# Patient Record
Sex: FEMALE | Race: WHITE | NOT HISPANIC OR LATINO | Employment: FULL TIME | URBAN - METROPOLITAN AREA
[De-identification: names, ages, dates, MRNs, and addresses within clinical notes are randomized per-mention and may not be internally consistent; named-entity substitution may affect disease eponyms.]

---
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
## 2019-05-14 ENCOUNTER — OFFICE VISIT (OUTPATIENT)
Dept: OBSTETRICS AND GYNECOLOGY | Age: 19
End: 2019-05-14

## 2019-05-14 VITALS
HEIGHT: 64 IN | WEIGHT: 225 LBS | DIASTOLIC BLOOD PRESSURE: 70 MMHG | SYSTOLIC BLOOD PRESSURE: 112 MMHG | BODY MASS INDEX: 38.41 KG/M2

## 2019-05-14 DIAGNOSIS — E66.09 CLASS 2 OBESITY DUE TO EXCESS CALORIES WITHOUT SERIOUS COMORBIDITY WITH BODY MASS INDEX (BMI) OF 38.0 TO 38.9 IN ADULT: ICD-10-CM

## 2019-05-14 DIAGNOSIS — Z01.411 ENCOUNTER FOR GYNECOLOGICAL EXAMINATION WITH ABNORMAL FINDING: Primary | ICD-10-CM

## 2019-05-14 DIAGNOSIS — L68.0 HIRSUTISM: ICD-10-CM

## 2019-05-14 DIAGNOSIS — N93.9 ABNORMAL UTERINE BLEEDING (AUB): ICD-10-CM

## 2019-05-14 DIAGNOSIS — L70.8 OTHER ACNE: ICD-10-CM

## 2019-05-14 DIAGNOSIS — N94.6 DYSMENORRHEA: ICD-10-CM

## 2019-05-14 PROBLEM — E66.812 CLASS 2 OBESITY DUE TO EXCESS CALORIES WITHOUT SERIOUS COMORBIDITY WITH BODY MASS INDEX (BMI) OF 38.0 TO 38.9 IN ADULT: Status: ACTIVE | Noted: 2019-05-14

## 2019-05-14 PROCEDURE — 99395 PREV VISIT EST AGE 18-39: CPT | Performed by: OBSTETRICS & GYNECOLOGY

## 2019-05-14 RX ORDER — NORGESTIMATE AND ETHINYL ESTRADIOL 0.25-0.035
1 KIT ORAL DAILY
Qty: 84 TABLET | Refills: 3 | Status: SHIPPED | OUTPATIENT
Start: 2019-05-14 | End: 2020-05-29

## 2019-05-14 NOTE — PROGRESS NOTES
"Subjective   Sita Amaya is a 18 y.o. female cc: annual visit, discuss  bcp , pt have no c/o , menses are regular lastup to 2 days not heavy - looking for a job - likes [employer] - has helped with cramps, acne and heavy bleeding. Has never been SA.      History of Present Illness    The following portions of the patient's history were reviewed and updated as appropriate: allergies, current medications, past family history, past medical history, past social history, past surgical history and problem list.    Review of Systems   Constitutional: Negative for chills, fatigue and fever.   Gastrointestinal: Negative for abdominal distention and abdominal pain.   Genitourinary: Negative for dyspareunia, dysuria, menstrual problem, pelvic pain, vaginal bleeding, vaginal discharge and vaginal pain.   All other systems reviewed and are negative.    /70   Ht 162.6 cm (64\")   Wt 102 kg (225 lb)   LMP 04/20/2019 (Exact Date)   Breastfeeding? No   BMI 38.62 kg/m²     Objective   Physical Exam   Constitutional: She is oriented to person, place, and time. She appears well-developed and well-nourished.   HENT:   Head: Normocephalic and atraumatic.   Eyes: Conjunctivae are normal.   Neck: Normal range of motion. Neck supple. No thyromegaly present.   Cardiovascular: Normal rate and regular rhythm.   Pulmonary/Chest: Effort normal and breath sounds normal.   Abdominal: Soft. Bowel sounds are normal. She exhibits no distension. There is no tenderness.   Genitourinary:   Genitourinary Comments: DEFERRED   Musculoskeletal: Normal range of motion.   Neurological: She is alert and oriented to person, place, and time.   Psychiatric: She has a normal mood and affect. Her behavior is normal.   Nursing note and vitals reviewed.        Assessment/Plan   Sita was seen today for gynecologic exam.    Diagnoses and all orders for this visit:    Encounter for gynecological examination with abnormal finding  -     norgestimate-ethinyl " estradiol (SPRINTEC 28) 0.25-35 MG-MCG per tablet; Take 1 tablet by mouth Daily.    Dysmenorrhea    Other acne  -     norgestimate-ethinyl estradiol (SPRINTEC 28) 0.25-35 MG-MCG per tablet; Take 1 tablet by mouth Daily.    Hirsutism  -     norgestimate-ethinyl estradiol (SPRINTEC 28) 0.25-35 MG-MCG per tablet; Take 1 tablet by mouth Daily.    Abnormal uterine bleeding (AUB)  -     norgestimate-ethinyl estradiol (SPRINTEC 28) 0.25-35 MG-MCG per tablet; Take 1 tablet by mouth Daily.    Class 2 obesity due to excess calories without serious comorbidity with body mass index (BMI) of 38.0 to 38.9 in adult    Counseling was given to patient for the following topics: instructions for management and nutrition, diet and weight loss  .   Return 1 year

## 2019-09-16 ENCOUNTER — OFFICE VISIT (OUTPATIENT)
Dept: FAMILY MEDICINE CLINIC | Facility: CLINIC | Age: 19
End: 2019-09-16

## 2019-09-16 VITALS
DIASTOLIC BLOOD PRESSURE: 72 MMHG | OXYGEN SATURATION: 98 % | RESPIRATION RATE: 17 BRPM | SYSTOLIC BLOOD PRESSURE: 104 MMHG | WEIGHT: 229.6 LBS | TEMPERATURE: 98.1 F | HEART RATE: 75 BPM | BODY MASS INDEX: 39.2 KG/M2 | HEIGHT: 64 IN

## 2019-09-16 DIAGNOSIS — F41.8 DEPRESSION WITH ANXIETY: Primary | ICD-10-CM

## 2019-09-16 DIAGNOSIS — L68.0 HIRSUTISM: ICD-10-CM

## 2019-09-16 DIAGNOSIS — Z00.00 ROUTINE ADULT HEALTH MAINTENANCE: ICD-10-CM

## 2019-09-16 DIAGNOSIS — F90.9 ATTENTION DEFICIT HYPERACTIVITY DISORDER (ADHD), UNSPECIFIED ADHD TYPE: ICD-10-CM

## 2019-09-16 PROCEDURE — 83036 HEMOGLOBIN GLYCOSYLATED A1C: CPT | Performed by: PHYSICIAN ASSISTANT

## 2019-09-16 PROCEDURE — 99204 OFFICE O/P NEW MOD 45 MIN: CPT | Performed by: PHYSICIAN ASSISTANT

## 2019-09-16 NOTE — PROGRESS NOTES
"Subjective   Sita Amaya is a 19 y.o. female present today to establish care with you. She would like to discuss depression with you.     History of Present Illness     Previous PCP- Dr Johnson- last seen 2018. They were treating her for medication for depression/ anxiety. Felt the wellbutrin was causing more panic attacks and was not helping.     Was in school [employer]- 2018- went to school there. They have providers there and they started on medication for moods. States she is not sure what the medication was. It helped sometimes but was not helping all the time. Was filled there as well. Reports it was a medication that she took when having panic attack. It was not daily medication. Rx made her sleepy. Was on Prozac in the past as well. Was on Prozac and Strattera in 6th grade- wouldn't eat and lost weight (was down to 90s lbs). Stopped both medications- states her mother didn't think it was helping. They stopped that and started Wellbutrin  mg. States the medication was working but recently lost her grandfather and great aunt. States she is still crying about it regularly. Cannot fall asleep at night. Started the before her grandfather . When she was in school, she did counseling. States she is no longer doing counseling. Talking to her aunt and her \"best friend\" and has been talking to a friend from college (her college friend told her to grow up and get over it). Was on Adderall for ADHD in the past. Reports when she was little, she took a medication that made her shaky (was for ADHD). Cannot remember any other medications.    No neuropsych testing that she is aware of. When she was in 2nd grade, she had a test that they put \"strips on my head\" but reports it was not an EEG. Was going to [facility]. Dr Johnson was pediatrician.    Last labs- 2018- had done at school. Not sure what they ran.    Is now back home. , helping her aunt clean and helping her cousins with home " school. Has a cousin with a 7 year old child- watched her all summer. Helps grandmother take care of 2 year old. Would like to work in a . States her mother is supposed to call [employer]  to see if she can work there. Swimming and bowling in [employer]. Needs CPE for this.     Had crackers this morning- nonfasting.    The following portions of the patient's history were reviewed and updated as appropriate: allergies, current medications, past family history, past medical history, past social history, past surgical history and problem list.    Review of Systems   All other systems reviewed and are negative.      Objective   Physical Exam   Constitutional: She is oriented to person, place, and time. She appears well-developed and well-nourished.   HENT:   Head: Normocephalic and atraumatic.   Right Ear: External ear normal.   Left Ear: External ear normal.   Nose: Nose normal.   Eyes: Conjunctivae and lids are normal.   Neck: Neck supple. Carotid bruit is not present.   Cardiovascular: Normal rate, regular rhythm, normal heart sounds and intact distal pulses. Exam reveals no gallop and no friction rub.   No murmur heard.  Pulmonary/Chest: Effort normal and breath sounds normal. No respiratory distress. She has no wheezes. She has no rhonchi. She has no rales.   Musculoskeletal: She exhibits no edema or deformity.   Neurological: She is alert and oriented to person, place, and time. Gait normal.   Skin: Skin is warm and dry.   Psychiatric: She has a normal mood and affect. Her speech is normal and behavior is normal. Judgment and thought content normal. Cognition and memory are normal.   Nursing note and vitals reviewed.      Assessment/Plan   Sita was seen today for establish care.    Diagnoses and all orders for this visit:    Depression with anxiety  -     NeuroPsych Testing; Future  -     CBC & Differential  -     Comprehensive Metabolic Panel  -     Lipid Panel With LDL / HDL Ratio  -      Iron and TIBC  -     Ferritin  -     Vitamin B12 & Folate  -     Vitamin D 25 Hydroxy  -     TSH  -     T4, free  -     T3, Free  -     POC Urinalysis Dipstick, Automated  -     POC Glycated Hemoglobin, Total    Attention deficit hyperactivity disorder (ADHD), unspecified ADHD type  -     NeuroPsych Testing; Future  -     CBC & Differential  -     Comprehensive Metabolic Panel  -     Lipid Panel With LDL / HDL Ratio  -     Iron and TIBC  -     Ferritin  -     Vitamin B12 & Folate  -     Vitamin D 25 Hydroxy  -     TSH  -     T4, free  -     T3, Free  -     POC Urinalysis Dipstick, Automated  -     POC Glycated Hemoglobin, Total    Hirsutism  -     Lipid Panel With LDL / HDL Ratio  -     POC Glycated Hemoglobin, Total    Routine adult health maintenance  -     CBC & Differential  -     Comprehensive Metabolic Panel  -     Lipid Panel With LDL / HDL Ratio  -     Iron and TIBC  -     Ferritin  -     Vitamin B12 & Folate  -     Vitamin D 25 Hydroxy  -     TSH  -     T4, free  -     T3, Free  -     POC Urinalysis Dipstick, Automated  -     POC Glycated Hemoglobin, Total      Patient Instructions   19 year old female who presents today as a new patient. Her previous PCP was Dr Johnson. She was last seen 11/2018. They were treating her for depression/ anxiety. She was on Wellbutrin, however, she felt this was causing more panic attacks and was not helping her moods. Patient has developmental delay but it was not specified today and she is in office alone. She was in school at [facility]. They have providers there and started on medication for moods. She does not know medication the name but reports it helped sometimes but was not helping all the time. She reports it was a medication that she took when having a panic attack. It was not a daily medication. The medication made her sleepy. Previously, she was on Prozac in the past as well. In 6th grade, she was on Prozac and Strattera and reports she lost weight (down to 90s  lbs) and was unable to eat. Patient stopped both medications. This is when she stopped started Wellbutrin  mg. She was also on Adderall for ADHD in the past. When she was young, she took another medication that made her shaky (was for ADHD). She cannot remember any other medications.    Patient initially reported Wellbutrin was not helping but was causing increased anxiety. However, she then reports it was working until she recently lost her grandfather and great aunt. She is still crying about it regularly and cannot fall asleep at night. When she was in school, she did counseling but is no longer in counseling.     Her last neuropsych testing was when she was in 2nd grade. It also sounds like she likely had EEG. This was with Dr Johnson. No neuropsych testing since that time. I will refer for neuropsych testing today. We will also check labs today. Further recommendations and follow up pending labs. We will continue medication for now- she should be seen ASAP if worsening moods, worsening, new or changing symptoms. Otherwise, we will determine medications and need for psychiatry/ psychology follow up.

## 2019-09-20 ENCOUNTER — OFFICE VISIT (OUTPATIENT)
Dept: FAMILY MEDICINE CLINIC | Facility: CLINIC | Age: 19
End: 2019-09-20

## 2019-09-20 VITALS
RESPIRATION RATE: 16 BRPM | TEMPERATURE: 97.9 F | WEIGHT: 230 LBS | DIASTOLIC BLOOD PRESSURE: 70 MMHG | HEART RATE: 69 BPM | SYSTOLIC BLOOD PRESSURE: 116 MMHG | HEIGHT: 64 IN | OXYGEN SATURATION: 97 % | BODY MASS INDEX: 39.27 KG/M2

## 2019-09-20 DIAGNOSIS — Z00.00 ROUTINE ADULT HEALTH MAINTENANCE: Primary | ICD-10-CM

## 2019-09-20 LAB
EXPIRATION DATE: NORMAL
HBA1C MFR BLD: 5.3 %
Lab: NORMAL

## 2019-09-20 PROCEDURE — 99395 PREV VISIT EST AGE 18-39: CPT | Performed by: PHYSICIAN ASSISTANT

## 2019-09-20 NOTE — PROGRESS NOTES
Subjective   Sita Amaya is a 19 y.o. female present today for annual exam. She states that she is needing these for the [employer].      History of Present Illness     Patient is with family member today. She is now reporting she had neuropsych testing again in 6th grade and possibly 11th grade but has not had testing since that time. No other concerns or complaints. She is otherwise doing ok.     The following portions of the patient's history were reviewed and updated as appropriate: allergies, current medications, past family history, past medical history, past social history, past surgical history and problem list.    Review of Systems   All other systems reviewed and are negative.      Objective   Physical Exam   Constitutional: She is oriented to person, place, and time. She appears well-developed and well-nourished. No distress.   HENT:   Head: Normocephalic and atraumatic.   Right Ear: Hearing, tympanic membrane, external ear and ear canal normal.   Left Ear: Hearing, tympanic membrane, external ear and ear canal normal.   Nose: Nose normal.   Mouth/Throat: Oropharynx is clear and moist.   Eyes: Conjunctivae, EOM and lids are normal. Pupils are equal, round, and reactive to light.   Neck: Neck supple. No JVD present. Carotid bruit is not present. No tracheal deviation present. No thyroid mass and no thyromegaly present.   Cardiovascular: Normal rate, regular rhythm, normal heart sounds and intact distal pulses. Exam reveals no gallop and no friction rub.   No murmur heard.  Pulses:       Radial pulses are 2+ on the right side, and 2+ on the left side.        Posterior tibial pulses are 2+ on the right side, and 2+ on the left side.   Pulmonary/Chest: Effort normal and breath sounds normal. No respiratory distress. She has no wheezes. She has no rhonchi. She has no rales.   Abdominal: Soft. Normal aorta and bowel sounds are normal. She exhibits no distension and no abdominal bruit. There is no  hepatosplenomegaly. There is no tenderness. There is no rigidity, no rebound and no guarding. No hernia.   Musculoskeletal: Normal range of motion. She exhibits no edema, tenderness or deformity.   Normal strength.   Lymphadenopathy:     She has no cervical adenopathy.   Neurological: She is alert and oriented to person, place, and time. She has normal strength and normal reflexes. She displays normal reflexes. No cranial nerve deficit or sensory deficit. She exhibits normal muscle tone. Coordination and gait normal.   Skin: Skin is warm and dry. No rash noted. She is not diaphoretic. No erythema.   Psychiatric: She has a normal mood and affect. Her speech is normal and behavior is normal. Judgment and thought content normal. Cognition and memory are normal.       Assessment/Plan   Siat was seen today for annual exam.    Diagnoses and all orders for this visit:    Routine adult health maintenance      Patient Instructions   19 year old female who presents today for annual CPE and CPE for [employer]. CPE completed today. Form completed for [employer]. She will have fasting labs today- call if no results in 1 week. Further recommendations and follow up pending labs.

## 2019-09-21 LAB
25(OH)D3+25(OH)D2 SERPL-MCNC: 24.1 NG/ML (ref 30–100)
ALBUMIN SERPL-MCNC: 4.3 G/DL (ref 3.5–5.2)
ALBUMIN/GLOB SERPL: 1.8 G/DL
ALP SERPL-CCNC: 77 U/L (ref 39–117)
ALT SERPL-CCNC: 16 U/L (ref 1–33)
AST SERPL-CCNC: 14 U/L (ref 1–32)
BASOPHILS # BLD AUTO: 0.05 10*3/MM3 (ref 0–0.2)
BASOPHILS NFR BLD AUTO: 0.9 % (ref 0–1.5)
BILIRUB SERPL-MCNC: 0.3 MG/DL (ref 0.2–1.2)
BUN SERPL-MCNC: 10 MG/DL (ref 6–20)
BUN/CREAT SERPL: 15.6 (ref 7–25)
CALCIUM SERPL-MCNC: 9.2 MG/DL (ref 8.6–10.5)
CHLORIDE SERPL-SCNC: 102 MMOL/L (ref 98–107)
CHOLEST SERPL-MCNC: 143 MG/DL (ref 0–200)
CO2 SERPL-SCNC: 27.8 MMOL/L (ref 22–29)
CREAT SERPL-MCNC: 0.64 MG/DL (ref 0.57–1)
EOSINOPHIL # BLD AUTO: 0.07 10*3/MM3 (ref 0–0.4)
EOSINOPHIL NFR BLD AUTO: 1.2 % (ref 0.3–6.2)
ERYTHROCYTE [DISTWIDTH] IN BLOOD BY AUTOMATED COUNT: 13.1 % (ref 12.3–15.4)
FERRITIN SERPL-MCNC: 37.5 NG/ML (ref 13–150)
FOLATE SERPL-MCNC: 9.1 NG/ML (ref 4.78–24.2)
GLOBULIN SER CALC-MCNC: 2.4 GM/DL
GLUCOSE SERPL-MCNC: 78 MG/DL (ref 65–99)
HCT VFR BLD AUTO: 38.1 % (ref 34–46.6)
HDLC SERPL-MCNC: 54 MG/DL (ref 40–60)
HGB BLD-MCNC: 12.7 G/DL (ref 12–15.9)
IMM GRANULOCYTES # BLD AUTO: 0.01 10*3/MM3 (ref 0–0.05)
IMM GRANULOCYTES NFR BLD AUTO: 0.2 % (ref 0–0.5)
IRON SATN MFR SERPL: 24 % (ref 20–50)
IRON SERPL-MCNC: 101 MCG/DL (ref 37–145)
LDLC SERPL CALC-MCNC: 74 MG/DL (ref 0–100)
LDLC/HDLC SERPL: 1.38 {RATIO}
LYMPHOCYTES # BLD AUTO: 2.67 10*3/MM3 (ref 0.7–3.1)
LYMPHOCYTES NFR BLD AUTO: 46.1 % (ref 19.6–45.3)
MCH RBC QN AUTO: 28.4 PG (ref 26.6–33)
MCHC RBC AUTO-ENTMCNC: 33.3 G/DL (ref 31.5–35.7)
MCV RBC AUTO: 85.2 FL (ref 79–97)
MONOCYTES # BLD AUTO: 0.39 10*3/MM3 (ref 0.1–0.9)
MONOCYTES NFR BLD AUTO: 6.7 % (ref 5–12)
NEUTROPHILS # BLD AUTO: 2.6 10*3/MM3 (ref 1.7–7)
NEUTROPHILS NFR BLD AUTO: 44.9 % (ref 42.7–76)
NRBC BLD AUTO-RTO: 0 /100 WBC (ref 0–0.2)
PLATELET # BLD AUTO: 393 10*3/MM3 (ref 140–450)
POTASSIUM SERPL-SCNC: 4.2 MMOL/L (ref 3.5–5.2)
PROT SERPL-MCNC: 6.7 G/DL (ref 6–8.5)
RBC # BLD AUTO: 4.47 10*6/MM3 (ref 3.77–5.28)
SODIUM SERPL-SCNC: 141 MMOL/L (ref 136–145)
T3FREE SERPL-MCNC: 3.2 PG/ML (ref 2–4.4)
T4 FREE SERPL-MCNC: 1.2 NG/DL (ref 0.93–1.7)
TIBC SERPL-MCNC: 426 MCG/DL
TRIGL SERPL-MCNC: 73 MG/DL (ref 0–150)
TSH SERPL DL<=0.005 MIU/L-ACNC: 1.46 UIU/ML (ref 0.27–4.2)
UIBC SERPL-MCNC: 325 MCG/DL (ref 112–346)
VIT B12 SERPL-MCNC: 290 PG/ML (ref 211–946)
VLDLC SERPL CALC-MCNC: 14.6 MG/DL
WBC # BLD AUTO: 5.79 10*3/MM3 (ref 3.4–10.8)

## 2019-09-30 NOTE — PATIENT INSTRUCTIONS
19 year old female who presents today as a new patient. Her previous PCP was Dr Johnson. She was last seen 11/2018. They were treating her for depression/ anxiety. She was on Wellbutrin, however, she felt this was causing more panic attacks and was not helping her moods. Patient has developmental delay but it was not specified today and she is in office alone. She was in school at [facility]. They have providers there and started on medication for moods. She does not know medication the name but reports it helped sometimes but was not helping all the time. She reports it was a medication that she took when having a panic attack. It was not a daily medication. The medication made her sleepy. Previously, she was on Prozac in the past as well. In 6th grade, she was on Prozac and Strattera and reports she lost weight (down to 90s lbs) and was unable to eat. Patient stopped both medications. This is when she stopped started Wellbutrin  mg. She was also on Adderall for ADHD in the past. When she was young, she took another medication that made her shaky (was for ADHD). She cannot remember any other medications.    Patient initially reported Wellbutrin was not helping but was causing increased anxiety. However, she then reports it was working until she recently lost her grandfather and great aunt. She is still crying about it regularly and cannot fall asleep at night. When she was in school, she did counseling but is no longer in counseling.     Her last neuropsych testing was when she was in 2nd grade. It also sounds like she likely had EEG. This was with Dr Johnson. No neuropsych testing since that time. I will refer for neuropsych testing today. We will also check labs today. Further recommendations and follow up pending labs. We will continue medication for now- she should be seen ASAP if worsening moods, worsening, new or changing symptoms. Otherwise, we will determine medications and need for psychiatry/  psychology follow up.

## 2019-09-30 NOTE — PATIENT INSTRUCTIONS
19 year old female who presents today for annual CPE and CPE for [employer]. CPE completed today. Form completed for [employer]. She will have fasting labs today- call if no results in 1 week. Further recommendations and follow up pending labs.

## 2019-10-25 RX ORDER — BUPROPION HYDROCHLORIDE 200 MG/1
200 TABLET, EXTENDED RELEASE ORAL DAILY
Qty: 30 TABLET | Refills: 0 | Status: SHIPPED | OUTPATIENT
Start: 2019-10-25 | End: 2021-03-12

## 2019-12-03 ENCOUNTER — OFFICE VISIT (OUTPATIENT)
Dept: FAMILY MEDICINE CLINIC | Facility: CLINIC | Age: 19
End: 2019-12-03

## 2019-12-03 VITALS
HEART RATE: 73 BPM | SYSTOLIC BLOOD PRESSURE: 110 MMHG | OXYGEN SATURATION: 98 % | WEIGHT: 235 LBS | HEIGHT: 64 IN | RESPIRATION RATE: 16 BRPM | DIASTOLIC BLOOD PRESSURE: 76 MMHG | BODY MASS INDEX: 40.12 KG/M2 | TEMPERATURE: 98.2 F

## 2019-12-03 DIAGNOSIS — N94.6 DYSMENORRHEA: ICD-10-CM

## 2019-12-03 DIAGNOSIS — E53.8 B12 DEFICIENCY: Primary | ICD-10-CM

## 2019-12-03 DIAGNOSIS — N92.1 MENORRHAGIA WITH IRREGULAR CYCLE: ICD-10-CM

## 2019-12-03 DIAGNOSIS — Z23 ENCOUNTER FOR IMMUNIZATION: ICD-10-CM

## 2019-12-03 DIAGNOSIS — F90.9 ATTENTION DEFICIT HYPERACTIVITY DISORDER (ADHD), UNSPECIFIED ADHD TYPE: ICD-10-CM

## 2019-12-03 DIAGNOSIS — F41.8 DEPRESSION WITH ANXIETY: ICD-10-CM

## 2019-12-03 DIAGNOSIS — E28.2 PCOS (POLYCYSTIC OVARIAN SYNDROME): ICD-10-CM

## 2019-12-03 LAB
B-HCG UR QL: NEGATIVE
INTERNAL NEGATIVE CONTROL: NEGATIVE
INTERNAL POSITIVE CONTROL: POSITIVE
Lab: NORMAL

## 2019-12-03 PROCEDURE — 81025 URINE PREGNANCY TEST: CPT | Performed by: PHYSICIAN ASSISTANT

## 2019-12-03 PROCEDURE — 90471 IMMUNIZATION ADMIN: CPT | Performed by: PHYSICIAN ASSISTANT

## 2019-12-03 PROCEDURE — 90674 CCIIV4 VAC NO PRSV 0.5 ML IM: CPT | Performed by: PHYSICIAN ASSISTANT

## 2019-12-03 PROCEDURE — 99214 OFFICE O/P EST MOD 30 MIN: CPT | Performed by: PHYSICIAN ASSISTANT

## 2019-12-03 RX ORDER — CHLORHEXIDINE GLUCONATE 0.12 MG/ML
RINSE ORAL
Refills: 0 | COMMUNITY
Start: 2019-09-19 | End: 2021-05-03

## 2019-12-03 NOTE — PROGRESS NOTES
"Subjective   Sita Amaya is a 19 y.o. female present today for a 7 week follow up on B12 and depression with anxiety.  Also having menstrual problems.    History of Present Illness     B12- taking 500 mcg and doing well.     Seeing [facility] physicians and they are changing her Wellbutrin to Zoloft. Was seen 11/27- told them Wellbutrin was no longer working. They decided to change to Zoloft. They sent it to [place] and they have to call to get it changed back to local pharmacy. Also doing counseling with them- has not started. Parents have to call to set it up.     Taking care of her dogs, will start working on her resume, but she is not sure \"when I'll get time to do that\".     For the past 2 months, has started menstrual cycle early each month. Sees Dr Daniella Ferrer. Has been on medication since age 16 and has never had this in the past. Prior to menses- would not have menses for 5-6 months then would bleed for 1-2 months straight. OCP had lightened and regulated menses, however, the last 2 months, she has had increased cramping and heavy bleeding. Using 3 pad, super tampons- changing every 4 hours. States this is very different than the 1st several years. Was Dx with PCOS. Thyroid check in 9/2019- normal. A1C 9/2019 was normal.     The following portions of the patient's history were reviewed and updated as appropriate: allergies, current medications, past family history, past medical history, past social history, past surgical history and problem list.    Review of Systems   Constitutional: Negative.    HENT: Negative.    Eyes: Negative.    Respiratory: Negative.    Cardiovascular: Negative.    Gastrointestinal: Positive for abdominal pain.   Endocrine: Negative.    Genitourinary: Positive for menstrual problem and pelvic pain. Negative for vaginal discharge.   Musculoskeletal: Negative.    Skin: Negative.    Allergic/Immunologic: Negative.    Neurological: Negative.    Psychiatric/Behavioral: Positive for " decreased concentration and dysphoric mood. The patient is nervous/anxious.        Objective    Vitals:    12/03/19 0815   BP: 110/76   Pulse: 73   Resp: 16   Temp: 98.2 °F (36.8 °C)   SpO2: 98%     Body mass index is 40.32 kg/m².    Physical Exam   Constitutional: She is oriented to person, place, and time. She appears well-developed and well-nourished.   HENT:   Head: Normocephalic and atraumatic.   Right Ear: External ear normal.   Left Ear: External ear normal.   Nose: Nose normal.   Eyes: Conjunctivae and lids are normal.   Neck: Neck supple. Carotid bruit is not present.   Cardiovascular: Normal rate, regular rhythm, normal heart sounds and intact distal pulses. Exam reveals no gallop and no friction rub.   No murmur heard.  Pulmonary/Chest: Effort normal and breath sounds normal. No respiratory distress. She has no wheezes. She has no rhonchi. She has no rales.   Musculoskeletal: She exhibits no edema or deformity.   Neurological: She is alert and oriented to person, place, and time. Gait normal.   Skin: Skin is warm and dry.   Psychiatric: She has a normal mood and affect. Her speech is normal and behavior is normal. Judgment and thought content normal. Cognition and memory are normal.   Nursing note and vitals reviewed.      Assessment/Plan   Sita was seen today for follow-up and menstrual problem.    Diagnoses and all orders for this visit:    B12 deficiency  -     CBC & Differential  -     Vitamin B12 & Folate    Depression with anxiety    Attention deficit hyperactivity disorder (ADHD), unspecified ADHD type    Dysmenorrhea  -     Iron and TIBC  -     Ferritin  -     POC Pregnancy, Urine    Menorrhagia with irregular cycle  -     Iron and TIBC  -     Ferritin  -     POC Pregnancy, Urine    PCOS (polycystic ovarian syndrome)    Encounter for immunization  -     Flucelvax Quad=>4Years ([id number])      Patient Instructions   19-year-old female who presents today in follow-up of B12 deficiency, depression  "with anxiety, ADHD, and mild intellectual disability.  Patient had labs in September 2019 and was found to have B12 and vitamin D deficiency.  She has been taking B12 500 mcg daily and tolerating well.  We will recheck her B12 today.  She will be due for recheck of her vitamin D in January or February.  Patient reports continued uncontrolled depression with anxiety.  She is seeing [facility] psychiatry and was seen last week.  She reports Wellbutrin has not been working for some time and they are changing her Wellbutrin to Zoloft. They sent her prescription to [place] and patient's mother will call to have it transferred to a local pharmacy.  She is also to start counseling with them but does not think this has been scheduled. Parents have to call to gradual counseling as well.  I reviewed pediatrician records today with a diagnosis of depression with anxiety, ADHD, and mild intellectual disability (intellectual quotient of 70-85).  They suggested gene site testing but I do not see results in the chart.  I will have her continue follow-up with psychiatry and stressed that she will need to do counseling with them as well.  Patient continues taking care of her dogs and is supposed to start working on her resume, but she she reports today that she does not know \"when I'll get time to do that\".  She is to call [facility] as well and did not do well there.  They will continue to work on trying to find work some trade to occupy her time in the day.    She is also having menstrual complaints today.  She reports for the past 2 months, she has started her menstrual cycle early each month.  Patient sees Dr Daniella Ferrer and has been on the same OCP since age 16. Prior to OCP, menses was very irregular.  She reports she would not have menstrual bleeding for 5-6 months then would bleed for 1-2 months straight.  With starting OCP, menses lightened and regulated, however, the last 2 months, she has had increased " cramping and heavy bleeding for 2 weeks, starting her last week of active pills.  She is using 3 pads and super tampons, changing every 4 hours.  She states this is very different than the 1st several years on OCP.  Patient reports she was diagnosed with PCOS in the past.  Patient had labs in September 2019 with normal thyroid testing and A1c.     I will recheck her labs today, including CBC, B12 and folate and I will check iron with increased bleeding.  I will also reach out to her OB/GYN to see if there are recommendations for further work-up.  Patient has never been sexually active per her report.  We will check an hCG today.

## 2019-12-03 NOTE — PATIENT INSTRUCTIONS
"19-year-old female who presents today in follow-up of B12 deficiency, depression with anxiety, ADHD, and mild intellectual disability.  Patient had labs in September 2019 and was found to have B12 and vitamin D deficiency.  She has been taking B12 500 mcg daily and tolerating well.  We will recheck her B12 today.  She will be due for recheck of her vitamin D in January or February.  Patient reports continued uncontrolled depression with anxiety.  She is seeing [facility] psychiatry and was seen last week.  She reports Wellbutrin has not been working for some time and they are changing her Wellbutrin to Zoloft. They sent her prescription to [place] and patient's mother will call to have it transferred to a local pharmacy.  She is also to start counseling with them but does not think this has been scheduled. Parents have to call to gradual counseling as well.  I reviewed pediatrician records today with a diagnosis of depression with anxiety, ADHD, and mild intellectual disability (intellectual quotient of 70-85).  They suggested gene site testing but I do not see results in the chart.  I will have her continue follow-up with psychiatry and stressed that she will need to do counseling with them as well.  Patient continues taking care of her dogs and is supposed to start working on her resume, but she she reports today that she does not know \"when I'll get time to do that\".  She is to call [facility] as well and did not do well there.  They will continue to work on trying to find work some trade to occupy her time in the day.    She is also having menstrual complaints today.  She reports for the past 2 months, she has started her menstrual cycle early each month.  Patient sees Dr Daniella Ferrer and has been on the same OCP since age 16. Prior to OCP, menses was very irregular.  She reports she would not have menstrual bleeding for 5-6 months then would bleed for 1-2 months straight.  With starting OCP, " menses lightened and regulated, however, the last 2 months, she has had increased cramping and heavy bleeding for 2 weeks, starting her last week of active pills.  She is using 3 pads and super tampons, changing every 4 hours.  She states this is very different than the 1st several years on OCP.  Patient reports she was diagnosed with PCOS in the past.  Patient had labs in September 2019 with normal thyroid testing and A1c.     I will recheck her labs today, including CBC, B12 and folate and I will check iron with increased bleeding.  I will also reach out to her OB/GYN to see if there are recommendations for further work-up.  Patient has never been sexually active per her report.  We will check an hCG today.

## 2019-12-04 ENCOUNTER — TELEPHONE (OUTPATIENT)
Dept: OBSTETRICS AND GYNECOLOGY | Age: 19
End: 2019-12-04

## 2019-12-04 ENCOUNTER — TELEPHONE (OUTPATIENT)
Dept: FAMILY MEDICINE CLINIC | Facility: CLINIC | Age: 19
End: 2019-12-04

## 2019-12-04 LAB
BASOPHILS # BLD AUTO: 0 X10E3/UL (ref 0–0.2)
BASOPHILS NFR BLD AUTO: 1 %
EOSINOPHIL # BLD AUTO: 0.1 X10E3/UL (ref 0–0.4)
EOSINOPHIL NFR BLD AUTO: 1 %
ERYTHROCYTE [DISTWIDTH] IN BLOOD BY AUTOMATED COUNT: 13.8 % (ref 12.3–15.4)
FERRITIN SERPL-MCNC: 39 NG/ML (ref 15–150)
FOLATE SERPL-MCNC: 7 NG/ML
HCT VFR BLD AUTO: 39.3 % (ref 34–46.6)
HGB BLD-MCNC: 12.8 G/DL (ref 11.1–15.9)
IMM GRANULOCYTES # BLD AUTO: 0 X10E3/UL (ref 0–0.1)
IMM GRANULOCYTES NFR BLD AUTO: 0 %
IRON SATN MFR SERPL: 7 % (ref 15–55)
IRON SERPL-MCNC: 25 UG/DL (ref 27–159)
LYMPHOCYTES # BLD AUTO: 3.2 X10E3/UL (ref 0.7–3.1)
LYMPHOCYTES NFR BLD AUTO: 41 %
MCH RBC QN AUTO: 28.3 PG (ref 26.6–33)
MCHC RBC AUTO-ENTMCNC: 32.6 G/DL (ref 31.5–35.7)
MCV RBC AUTO: 87 FL (ref 79–97)
MONOCYTES # BLD AUTO: 0.5 X10E3/UL (ref 0.1–0.9)
MONOCYTES NFR BLD AUTO: 7 %
NEUTROPHILS # BLD AUTO: 4 X10E3/UL (ref 1.4–7)
NEUTROPHILS NFR BLD AUTO: 50 %
PLATELET # BLD AUTO: 443 X10E3/UL (ref 150–450)
RBC # BLD AUTO: 4.52 X10E6/UL (ref 3.77–5.28)
TIBC SERPL-MCNC: 371 UG/DL (ref 250–450)
UIBC SERPL-MCNC: 346 UG/DL (ref 131–425)
VIT B12 SERPL-MCNC: 459 PG/ML (ref 232–1245)
WBC # BLD AUTO: 7.9 X10E3/UL (ref 3.4–10.8)

## 2019-12-04 NOTE — TELEPHONE ENCOUNTER
----- Message from Daniella Ferrer MD sent at 12/4/2019  9:38 AM EST -----  Regarding: RE: mutual patient with menstrual complaints  Thank you! I will have my nurse call her and see if she needs an appt.      ----- Message -----  From: Taryn Sandoval PA  Sent: 12/3/2019   9:03 AM  To: Daniella Ferrer MD  Subject: mutual patient with menstrual complaints         Patient was in the office today and reports some increased menstrual issues the last 2 months.  She states she is been on the same OCP since 2017 and has done well with it.  The last 2 months, she has started with vaginal bleeding a week before ending her pills and reports increased cramping and heavy bleeding that is abnormal for her.  She denies ever being sexually active and thyroid was checked 9/2019.  Please let me know if there is something that you would recommend for further work-up or medication change.

## 2019-12-04 NOTE — TELEPHONE ENCOUNTER
----- Message from FATOU Sanabria sent at 12/3/2019  9:03 AM EST -----  Regarding: mutual patient with menstrual complaints  Patient was in the office today and reports some increased menstrual issues the last 2 months.  She states she is been on the same OCP since 2017 and has done well with it.  The last 2 months, she has started with vaginal bleeding a week before ending her pills and reports increased cramping and heavy bleeding that is abnormal for her.  She denies ever being sexually active and thyroid was checked 9/2019.  Please let me know if there is something that you would recommend for further work-up or medication change.

## 2020-05-19 ENCOUNTER — TELEPHONE (OUTPATIENT)
Dept: OBSTETRICS AND GYNECOLOGY | Age: 20
End: 2020-05-19

## 2020-05-29 DIAGNOSIS — L70.8 OTHER ACNE: ICD-10-CM

## 2020-05-29 DIAGNOSIS — Z01.411 ENCOUNTER FOR GYNECOLOGICAL EXAMINATION WITH ABNORMAL FINDING: ICD-10-CM

## 2020-05-29 DIAGNOSIS — L68.0 HIRSUTISM: ICD-10-CM

## 2020-05-29 DIAGNOSIS — N93.9 ABNORMAL UTERINE BLEEDING (AUB): ICD-10-CM

## 2020-05-29 RX ORDER — NORGESTIMATE AND ETHINYL ESTRADIOL 0.25-0.035
KIT ORAL
Qty: 84 TABLET | Refills: 1 | Status: SHIPPED | OUTPATIENT
Start: 2020-05-29 | End: 2020-07-28

## 2020-06-30 ENCOUNTER — OFFICE VISIT (OUTPATIENT)
Dept: OBSTETRICS AND GYNECOLOGY | Age: 20
End: 2020-06-30

## 2020-06-30 VITALS
BODY MASS INDEX: 38.93 KG/M2 | DIASTOLIC BLOOD PRESSURE: 80 MMHG | WEIGHT: 228 LBS | SYSTOLIC BLOOD PRESSURE: 125 MMHG | HEIGHT: 64 IN

## 2020-06-30 DIAGNOSIS — N94.6 DYSMENORRHEA: ICD-10-CM

## 2020-06-30 DIAGNOSIS — Z01.419 ENCOUNTER FOR GYNECOLOGICAL EXAMINATION WITHOUT ABNORMAL FINDING: Primary | ICD-10-CM

## 2020-06-30 DIAGNOSIS — L68.0 HIRSUTISM: ICD-10-CM

## 2020-06-30 DIAGNOSIS — Z13.89 SCREENING FOR BLOOD OR PROTEIN IN URINE: ICD-10-CM

## 2020-06-30 DIAGNOSIS — L70.8 OTHER ACNE: ICD-10-CM

## 2020-06-30 DIAGNOSIS — N93.9 ABNORMAL UTERINE BLEEDING (AUB): ICD-10-CM

## 2020-06-30 DIAGNOSIS — Z12.4 SCREENING FOR MALIGNANT NEOPLASM OF THE CERVIX: ICD-10-CM

## 2020-06-30 DIAGNOSIS — Z13.9 SPECIAL SCREENING: ICD-10-CM

## 2020-06-30 PROBLEM — E66.09 CLASS 2 OBESITY DUE TO EXCESS CALORIES WITHOUT SERIOUS COMORBIDITY WITH BODY MASS INDEX (BMI) OF 38.0 TO 38.9 IN ADULT: Status: RESOLVED | Noted: 2019-05-14 | Resolved: 2020-06-30

## 2020-06-30 PROBLEM — E66.812 CLASS 2 OBESITY DUE TO EXCESS CALORIES WITHOUT SERIOUS COMORBIDITY WITH BODY MASS INDEX (BMI) OF 38.0 TO 38.9 IN ADULT: Status: RESOLVED | Noted: 2019-05-14 | Resolved: 2020-06-30

## 2020-06-30 LAB
B-HCG UR QL: NEGATIVE
BILIRUB BLD-MCNC: NEGATIVE MG/DL
GLUCOSE UR STRIP-MCNC: NEGATIVE MG/DL
INTERNAL NEGATIVE CONTROL: NEGATIVE
INTERNAL POSITIVE CONTROL: POSITIVE
KETONES UR QL: NEGATIVE
LEUKOCYTE EST, POC: NEGATIVE
Lab: NORMAL
NITRITE UR-MCNC: NEGATIVE MG/ML
PH UR: 5 [PH] (ref 5–8)
PROT UR STRIP-MCNC: NEGATIVE MG/DL
RBC # UR STRIP: ABNORMAL /UL
SP GR UR: 1.03 (ref 1–1.03)
UROBILINOGEN UR QL: NORMAL

## 2020-06-30 PROCEDURE — 99395 PREV VISIT EST AGE 18-39: CPT | Performed by: OBSTETRICS & GYNECOLOGY

## 2020-06-30 PROCEDURE — 81025 URINE PREGNANCY TEST: CPT | Performed by: OBSTETRICS & GYNECOLOGY

## 2020-06-30 PROCEDURE — 81002 URINALYSIS NONAUTO W/O SCOPE: CPT | Performed by: OBSTETRICS & GYNECOLOGY

## 2020-06-30 NOTE — PROGRESS NOTES
"Subjective   Sita Amaya is a 20 y.o. female cc; annual visit today , pt wants to discuss changing the bcp , been on the pill for over 3 yrs ,c/o  irregular  long lasting periods , sometimes she forgets the pills , denies any discharge today or any cramping today. Patient is SA. Discussed other options. Patient would like to try the Kyleena. Discussed that it would not treat the acne.    History of Present Illness    The following portions of the patient's history were reviewed and updated as appropriate: allergies, current medications, past family history, past medical history, past social history, past surgical history and problem list.    Review of Systems   Constitutional: Negative for chills, fatigue and fever.   Gastrointestinal: Negative for abdominal distention and abdominal pain.   Genitourinary: Positive for menstrual problem and pelvic pain. Negative for dyspareunia, dysuria, vaginal bleeding, vaginal discharge and vaginal pain.   All other systems reviewed and are negative.  /80   Ht 162.6 cm (64.02\")   Wt 103 kg (228 lb)   LMP 06/26/2020 (Approximate)   Breastfeeding No   BMI 39.11 kg/m²       Objective   Physical Exam   Constitutional: She is oriented to person, place, and time. She appears well-developed and well-nourished.   Neck: Normal range of motion. Neck supple. No thyromegaly present.   Cardiovascular: Normal rate and regular rhythm.   Pulmonary/Chest: Effort normal and breath sounds normal. Right breast exhibits no mass, no nipple discharge, no skin change and no tenderness. Left breast exhibits no mass, no nipple discharge, no skin change and no tenderness.   Abdominal: Soft. Bowel sounds are normal. She exhibits no distension. There is no tenderness.   Genitourinary: Vagina normal and uterus normal. Pelvic exam was performed with patient supine. Uterus is not tender. Cervix exhibits no friability. Right adnexum displays no mass and no tenderness. Left adnexum displays no mass " and no tenderness. No vaginal discharge found.   Genitourinary Comments: Exam limited by body habitus   Musculoskeletal: Normal range of motion. She exhibits no edema.   Neurological: She is alert and oriented to person, place, and time.   Skin: Skin is warm and dry. No rash noted.   Psychiatric: She has a normal mood and affect. Her behavior is normal.   Nursing note and vitals reviewed.        Assessment/Plan   Sita was seen today for gynecologic exam.    Diagnoses and all orders for this visit:    Encounter for gynecological examination without abnormal finding    Screening for malignant neoplasm of the cervix  -     IGP, Rfx Aptima HPV ASCU    Screening for blood or protein in urine  -     POC Urinalysis Dipstick    Dysmenorrhea  -     NuSwab VG+ - Swab, Vagina    Other acne    Abnormal uterine bleeding (AUB)    Hirsutism    Special screening  -     POC Pregnancy, Urine      Counseling was given to patient for the following topics: risks and benefits of treatment options and contraceptive options .   Return for Kyleena

## 2020-07-06 LAB
CONV .: NORMAL
CYTOLOGIST CVX/VAG CYTO: NORMAL
CYTOLOGY CVX/VAG DOC CYTO: NORMAL
CYTOLOGY CVX/VAG DOC THIN PREP: NORMAL
DX ICD CODE: NORMAL
HIV 1 & 2 AB SER-IMP: NORMAL
Lab: NORMAL
OTHER STN SPEC: NORMAL
STAT OF ADQ CVX/VAG CYTO-IMP: NORMAL

## 2020-07-08 ENCOUNTER — TELEPHONE (OUTPATIENT)
Dept: OBSTETRICS AND GYNECOLOGY | Age: 20
End: 2020-07-08

## 2020-07-08 NOTE — TELEPHONE ENCOUNTER
----- Message from Daniella Ferrer MD sent at 7/7/2020 11:07 AM EDT -----  Please call patient and notify of normal results of pap smear - vaginal swab pending

## 2020-07-10 LAB
A VAGINAE DNA VAG QL NAA+PROBE: NORMAL SCORE
BVAB2 DNA VAG QL NAA+PROBE: NORMAL SCORE
C ALBICANS DNA VAG QL NAA+PROBE: NORMAL
C GLABRATA DNA VAG QL NAA+PROBE: NORMAL
C TRACH DNA VAG QL NAA+PROBE: NEGATIVE
MEGA1 DNA VAG QL NAA+PROBE: NORMAL SCORE
N GONORRHOEA DNA VAG QL NAA+PROBE: NEGATIVE
T VAGINALIS DNA VAG QL NAA+PROBE: NEGATIVE

## 2020-07-28 ENCOUNTER — PROCEDURE VISIT (OUTPATIENT)
Dept: OBSTETRICS AND GYNECOLOGY | Age: 20
End: 2020-07-28

## 2020-07-28 VITALS
SYSTOLIC BLOOD PRESSURE: 127 MMHG | DIASTOLIC BLOOD PRESSURE: 80 MMHG | HEIGHT: 64 IN | WEIGHT: 229 LBS | BODY MASS INDEX: 39.09 KG/M2

## 2020-07-28 DIAGNOSIS — N93.9 ABNORMAL UTERINE BLEEDING (AUB): ICD-10-CM

## 2020-07-28 DIAGNOSIS — Z13.9 SPECIAL SCREENING: ICD-10-CM

## 2020-07-28 DIAGNOSIS — Z30.430 ENCOUNTER FOR INSERTION OF INTRAUTERINE CONTRACEPTIVE DEVICE (IUD): Primary | ICD-10-CM

## 2020-07-28 DIAGNOSIS — N94.6 DYSMENORRHEA: ICD-10-CM

## 2020-07-28 PROCEDURE — 81025 URINE PREGNANCY TEST: CPT | Performed by: OBSTETRICS & GYNECOLOGY

## 2020-07-28 PROCEDURE — 58300 INSERT INTRAUTERINE DEVICE: CPT | Performed by: OBSTETRICS & GYNECOLOGY

## 2020-07-28 NOTE — PROGRESS NOTES
IUD Insertion Procedure Note    Pre-operative Diagnosis: AUB/Dysmenorrhea    Post-operative Diagnosis: same    Indications: contraception    Procedure Details   Urine pregnancy test was done and result was neg.  The risks (including infection, bleeding, pain, and uterine perforation) and benefits of the procedure were explained to the patient and Written informed consent was obtained.      Cervix cleansed with Betadine. Uterus sounded to 6 cm. IUD inserted without difficulty. String visible and trimmed.    IUD Information:  Kyleena, Lot # [id number], Expiration date Dec 2021.    Condition:  Stable    Complications:  None  Patient tolerated the procedure well without complications.    Plan:    The patient was advised to call for any fever or for prolonged or severe pain or bleeding. She was advised to use NSAID as needed for mild to moderate pain.     Attending Physician Documentation:  I was present for or participated in the entire procedure, including opening and closing.

## 2020-08-25 ENCOUNTER — TELEPHONE (OUTPATIENT)
Dept: OBSTETRICS AND GYNECOLOGY | Age: 20
End: 2020-08-25

## 2020-08-25 NOTE — TELEPHONE ENCOUNTER
Patient called and states that she has not stopped bleeding since having her IUD inserted.  She states she is bleeding more than a pad a hour.  I advised patient she needs to go to [facility] ER and she verbalized understanding.

## 2020-09-09 ENCOUNTER — OFFICE VISIT (OUTPATIENT)
Dept: OBSTETRICS AND GYNECOLOGY | Age: 20
End: 2020-09-09

## 2020-09-09 ENCOUNTER — PROCEDURE VISIT (OUTPATIENT)
Dept: OBSTETRICS AND GYNECOLOGY | Age: 20
End: 2020-09-09

## 2020-09-09 VITALS
BODY MASS INDEX: 37.92 KG/M2 | HEIGHT: 65 IN | SYSTOLIC BLOOD PRESSURE: 118 MMHG | DIASTOLIC BLOOD PRESSURE: 62 MMHG | WEIGHT: 227.6 LBS

## 2020-09-09 DIAGNOSIS — Z30.431 IUD CHECK UP: Primary | ICD-10-CM

## 2020-09-09 DIAGNOSIS — N92.6 IRREGULAR MENSTRUAL BLEEDING: ICD-10-CM

## 2020-09-09 DIAGNOSIS — Z32.01 POSITIVE PREGNANCY TEST: Primary | ICD-10-CM

## 2020-09-09 DIAGNOSIS — Z30.431 IUD CHECK UP: ICD-10-CM

## 2020-09-09 LAB
B-HCG UR QL: POSITIVE
INTERNAL NEGATIVE CONTROL: NEGATIVE
INTERNAL POSITIVE CONTROL: POSITIVE
Lab: ABNORMAL

## 2020-09-09 PROCEDURE — 76830 TRANSVAGINAL US NON-OB: CPT | Performed by: OBSTETRICS & GYNECOLOGY

## 2020-09-09 PROCEDURE — 81025 URINE PREGNANCY TEST: CPT | Performed by: NURSE PRACTITIONER

## 2020-09-09 PROCEDURE — 99213 OFFICE O/P EST LOW 20 MIN: CPT | Performed by: NURSE PRACTITIONER

## 2020-09-09 RX ORDER — SERTRALINE HYDROCHLORIDE 100 MG/1
TABLET, FILM COATED ORAL
COMMUNITY
Start: 2020-08-24 | End: 2021-03-12

## 2020-09-09 NOTE — PROGRESS NOTES
Subjective   Sita Amaya is a 20 y.o. female.     History of Present Illness     Sita presents for routine IUD string check  kyleena placed without difficulty or complication 7/28/2020  She reports frequent bleeding first month but that is seeming to resolve. Denies pelvic pain/cramping    Urine hcg positive in office  US obtained and shows IUD at fundus  Bilateral ovaries polycystic  No evidence of pregnancy or ectopic    The following portions of the patient's history were reviewed and updated as appropriate: allergies, current medications, past family history, past medical history, past social history, past surgical history and problem list.    Review of Systems   Constitutional: Negative for chills, fatigue and fever.   Gastrointestinal: Negative for abdominal distention and abdominal pain.   Genitourinary: Negative for decreased urine volume, dyspareunia, dysuria, genital sores, hematuria, menstrual problem, pelvic pain, pelvic pressure, urgency, urinary incontinence, vaginal bleeding, vaginal discharge and vaginal pain.       Objective   Physical Exam   Constitutional: She is oriented to person, place, and time. She appears well-developed and well-nourished. No distress.   Genitourinary: Vagina normal and uterus normal. There is no rash, tenderness, lesion, injury or Bartholin's cyst on the right labia. There is no rash, tenderness, lesion, injury or Bartholin's cyst on the left labia. Cervix exhibits visible IUD strings. Right adnexum displays no mass, no tenderness and no fullness. Left adnexum displays no mass, no tenderness and no fullness.   Neurological: She is alert and oriented to person, place, and time.   Skin: Skin is warm and dry.   Psychiatric: She has a normal mood and affect. Her behavior is normal.       Assessment/Plan   Sita was seen today for gynecologic exam.    Diagnoses and all orders for this visit:    IUD check up  -     POC Pregnancy, Urine    Irregular menstrual bleeding  -      POC Pregnancy, Urine  -     HCG, B-subunit, Quantitative  -     ABO / Rh        Stat Quant HCG drawn  Received call from [facility] at 1530- quant results hcg <0.50.  Patient notified and reassured.      ANA Bourgeois

## 2020-09-10 LAB
ABO GROUP BLD: NORMAL
HCG INTACT+B SERPL-ACNC: <1 MIU/ML
RH BLD: POSITIVE

## 2020-11-09 DIAGNOSIS — N92.6 IRREGULAR MENSES: Primary | ICD-10-CM

## 2020-11-16 ENCOUNTER — OFFICE VISIT (OUTPATIENT)
Dept: OBSTETRICS AND GYNECOLOGY | Age: 20
End: 2020-11-16

## 2020-11-16 VITALS
DIASTOLIC BLOOD PRESSURE: 68 MMHG | WEIGHT: 225.8 LBS | BODY MASS INDEX: 38.55 KG/M2 | HEIGHT: 64 IN | SYSTOLIC BLOOD PRESSURE: 110 MMHG

## 2020-11-16 DIAGNOSIS — N92.6 IRREGULAR MENSES: Primary | ICD-10-CM

## 2020-11-16 PROCEDURE — 99212 OFFICE O/P EST SF 10 MIN: CPT | Performed by: NURSE PRACTITIONER

## 2020-11-16 PROCEDURE — 81025 URINE PREGNANCY TEST: CPT | Performed by: NURSE PRACTITIONER

## 2020-11-16 RX ORDER — FLUOXETINE HYDROCHLORIDE 40 MG/1
CAPSULE ORAL DAILY
COMMUNITY
Start: 2020-11-13 | End: 2022-11-22

## 2020-11-16 RX ORDER — PROPRANOLOL HYDROCHLORIDE 10 MG/1
10 TABLET ORAL 3 TIMES DAILY
COMMUNITY
Start: 2020-11-13 | End: 2021-06-02

## 2020-11-17 LAB — HCG INTACT+B SERPL-ACNC: <1 MIU/ML

## 2020-11-17 NOTE — PROGRESS NOTES
Subjective   Sita Amaya is a 20 y.o. female.     History of Present Illness     Sita presents with complaint of missed menses on IUD and ? Positive HPT  Urine hcg is negative in office- will confirm with serum  No complaints today  Denies new partners   Overall doing well.  Discussed potential bleeding patterns on the IUD, including amenorrhea  IUD warnings discussed.    The following portions of the patient's history were reviewed and updated as appropriate: allergies, current medications, past family history, past medical history, past social history, past surgical history and problem list.    Review of Systems   Constitutional: Negative for chills, fatigue and fever.   Gastrointestinal: Negative for abdominal distention and abdominal pain.   Genitourinary: Negative for decreased urine volume, difficulty urinating, dyspareunia, dysuria, frequency, genital sores, hematuria, menstrual problem, pelvic pain, pelvic pressure, urgency, urinary incontinence, vaginal bleeding, vaginal discharge and vaginal pain.       Objective   Physical Exam  Constitutional:       Appearance: Normal appearance. She is not ill-appearing.   Skin:     General: Skin is warm and dry.   Neurological:      General: No focal deficit present.      Mental Status: She is alert and oriented to person, place, and time.   Psychiatric:         Mood and Affect: Mood normal.         Behavior: Behavior normal.         Assessment/Plan   Diagnoses and all orders for this visit:    1. Irregular menses (Primary)  -     HCG, B-subunit, Quantitative  -     POC Pregnancy, Urine      ANA Bourgeois

## 2020-12-09 ENCOUNTER — OFFICE VISIT (OUTPATIENT)
Dept: OBSTETRICS AND GYNECOLOGY | Age: 20
End: 2020-12-09

## 2020-12-09 ENCOUNTER — PROCEDURE VISIT (OUTPATIENT)
Dept: OBSTETRICS AND GYNECOLOGY | Age: 20
End: 2020-12-09

## 2020-12-09 VITALS
WEIGHT: 229 LBS | HEIGHT: 65 IN | SYSTOLIC BLOOD PRESSURE: 108 MMHG | BODY MASS INDEX: 38.15 KG/M2 | DIASTOLIC BLOOD PRESSURE: 68 MMHG

## 2020-12-09 DIAGNOSIS — Z30.40 ENCOUNTER FOR SURVEILLANCE OF CONTRACEPTIVES, UNSPECIFIED CONTRACEPTIVE: Primary | ICD-10-CM

## 2020-12-09 DIAGNOSIS — R10.2 PELVIC PAIN: Primary | ICD-10-CM

## 2020-12-09 PROBLEM — Q51.3 BICORNATE UTERUS: Status: ACTIVE | Noted: 2020-12-09

## 2020-12-09 PROBLEM — N93.9 ABNORMAL UTERINE BLEEDING (AUB): Status: RESOLVED | Noted: 2017-02-28 | Resolved: 2020-12-09

## 2020-12-09 PROCEDURE — 99213 OFFICE O/P EST LOW 20 MIN: CPT | Performed by: NURSE PRACTITIONER

## 2020-12-09 PROCEDURE — 76830 TRANSVAGINAL US NON-OB: CPT | Performed by: OBSTETRICS & GYNECOLOGY

## 2020-12-09 NOTE — PROGRESS NOTES
Subjective   Sita Amaya is a 20 y.o. female.     History of Present Illness     Sita presents for f/u US for pelvic pain  Sita reports intermittent mild to moderate pain since IUD insertion    US shows bicornate uterus. IUD in fundus but arms are not open  Also noted right simple ovarian cyst  Counseled patient on findings    She would like to discuss alternate contraceptive options    The following portions of the patient's history were reviewed and updated as appropriate: allergies, current medications, past family history, past medical history, past social history, past surgical history and problem list.    Review of Systems   Constitutional: Negative for chills, fatigue and fever.   Gastrointestinal: Negative for abdominal distention and abdominal pain.   Genitourinary: Positive for pelvic pain. Negative for decreased urine volume, difficulty urinating, dyspareunia, dysuria, frequency, genital sores, hematuria, menstrual problem, pelvic pressure, urgency, urinary incontinence, vaginal bleeding, vaginal discharge and vaginal pain.       Objective   Physical Exam  Constitutional:       Appearance: Normal appearance. She is not ill-appearing.   Skin:     General: Skin is warm and dry.   Neurological:      General: No focal deficit present.      Mental Status: She is alert and oriented to person, place, and time.   Psychiatric:         Mood and Affect: Mood normal.         Behavior: Behavior normal.         Assessment/Plan   Diagnoses and all orders for this visit:    1. Encounter for surveillance of contraceptives, unspecified contraceptive (Primary)      Sita desires nexplanon  We will get her precerted for this.  She will return for IUD removal and nexplanon placement      ANA Bourgeois

## 2020-12-22 ENCOUNTER — PROCEDURE VISIT (OUTPATIENT)
Dept: OBSTETRICS AND GYNECOLOGY | Age: 20
End: 2020-12-22

## 2020-12-22 VITALS
WEIGHT: 227 LBS | DIASTOLIC BLOOD PRESSURE: 70 MMHG | HEIGHT: 65 IN | BODY MASS INDEX: 37.82 KG/M2 | SYSTOLIC BLOOD PRESSURE: 125 MMHG

## 2020-12-22 DIAGNOSIS — Z30.432 ENCOUNTER FOR IUD REMOVAL: ICD-10-CM

## 2020-12-22 DIAGNOSIS — Z30.017 NEXPLANON INSERTION: Primary | ICD-10-CM

## 2020-12-22 DIAGNOSIS — Z13.9 SPECIAL SCREENING: ICD-10-CM

## 2020-12-22 PROCEDURE — 11981 INSERTION DRUG DLVR IMPLANT: CPT | Performed by: OBSTETRICS & GYNECOLOGY

## 2020-12-22 PROCEDURE — 58301 REMOVE INTRAUTERINE DEVICE: CPT | Performed by: OBSTETRICS & GYNECOLOGY

## 2020-12-22 PROCEDURE — 81025 URINE PREGNANCY TEST: CPT | Performed by: OBSTETRICS & GYNECOLOGY

## 2020-12-22 NOTE — PROGRESS NOTES
Procedure    Insert Drug Implant    Date/Time: 12/22/2020 10:13 AM  Performed by: Daniella Ferrer MD  Authorized by: Daniella Ferrer MD   Local anesthesia used: yes  Anesthesia: local infiltration    Anesthesia:  Local anesthesia used: yes  Local Anesthetic: lidocaine 1% with epinephrine  Anesthetic total: 5 mL    Sedation:  Patient sedated: no    Patient tolerance: Patient tolerated the procedure well with no immediate complications  Comments: Alcohol swab to left arm approximately 6cm from medial epicondyle - lidocaine injected - hibaclens x 2 - Nexplanon inserted without difficulty.  Band-aid placed and arm wrapped with ace bandage.

## 2020-12-22 NOTE — PROGRESS NOTES
Procedure   Procedures    IUD Removal Procedure Note    Type of IUD:  Kyleena   Date of insertion:  known  Reason for removal:  Side effect: cramping  Other relevant history/information:  none    Procedure Time Out Documentation      Procedure Details  IUD strings visible:  yes  Local anesthesia:  None  Tenaculum used:  None  Removal:  IUD strings grasped and IUD removed intact with gentle traction.  The patient tolerated the procedure well.    All appropriate instructions regarding removal were reviewed.    Patient tolerated the procedure well without complications.    Plans for contraception:  Nexplanon    Other follow-up needed:  none    The patient was advised to call for any fever or for prolonged or severe pain or bleeding. She was advised to use NSAID as needed for mild to moderate pain.

## 2021-03-12 ENCOUNTER — OFFICE VISIT (OUTPATIENT)
Dept: OBSTETRICS AND GYNECOLOGY | Age: 21
End: 2021-03-12

## 2021-03-12 VITALS
HEIGHT: 65 IN | DIASTOLIC BLOOD PRESSURE: 78 MMHG | BODY MASS INDEX: 38.49 KG/M2 | WEIGHT: 231 LBS | SYSTOLIC BLOOD PRESSURE: 122 MMHG

## 2021-03-12 DIAGNOSIS — Z30.46 NEXPLANON REMOVAL: Primary | ICD-10-CM

## 2021-03-12 PROCEDURE — 11982 REMOVE DRUG IMPLANT DEVICE: CPT | Performed by: NURSE PRACTITIONER

## 2021-03-12 NOTE — PROGRESS NOTES
Nexplanon Removal Procedure Note    Pre-operative Diagnosis: Nexplanon complication None    Post-operative Diagnosis: same    Indications: Desires pregnancy    Procedure Details   The risks (including infection, bruising, irregular bleeding, pain at removal site, and injury to muscles, nerves and blood vessels) and benefits of the procedure were explained to the patient and/or guardian and written informed consent was obtained.      Nexplanon device was easily located by palpation of inner arm.  The device insertion site was cleansed with chloraprep and allowed to dry.  Device site anesthetized with 3 ml 2% lidocaine with epi.  End of device was located and 11 blade was used to make small incision.  Device was located in subcutaneou tissue, grasped with hemostat and removed intact. Incision site was closed with steri-strips and band aid.   Pressure dressing applied.  There were no complications.      Pt tolerated procedure well      Condition:  Stable    Complications:  None    Plan:    The patient was advised to call for any rash, arm pain, fever, warmth or for prolonged bruising or bleeding. She was advised to use OTC acetaminophen as needed for mild to moderate pain.   Can remove pressure dressing in 24 hours. Band aid and steri strips in 2-3 days    Recommended patient start taking daily PNV. Call with positive UPT.

## 2021-05-03 ENCOUNTER — OFFICE VISIT (OUTPATIENT)
Dept: FAMILY MEDICINE CLINIC | Facility: CLINIC | Age: 21
End: 2021-05-03

## 2021-05-03 VITALS
HEART RATE: 93 BPM | SYSTOLIC BLOOD PRESSURE: 122 MMHG | TEMPERATURE: 97.8 F | BODY MASS INDEX: 39.49 KG/M2 | HEIGHT: 65 IN | OXYGEN SATURATION: 96 % | WEIGHT: 237 LBS | DIASTOLIC BLOOD PRESSURE: 70 MMHG

## 2021-05-03 DIAGNOSIS — R21 RASH: Primary | ICD-10-CM

## 2021-05-03 PROCEDURE — 99213 OFFICE O/P EST LOW 20 MIN: CPT | Performed by: PHYSICIAN ASSISTANT

## 2021-05-03 RX ORDER — CLOBETASOL PROPIONATE 0.5 MG/G
OINTMENT TOPICAL 2 TIMES DAILY
Qty: 60 G | Refills: 0 | Status: SHIPPED | OUTPATIENT
Start: 2021-05-03 | End: 2021-05-10

## 2021-05-03 RX ORDER — LORATADINE 10 MG/1
10 TABLET ORAL 2 TIMES DAILY
Qty: 30 TABLET | Refills: 0 | Status: SHIPPED | OUTPATIENT
Start: 2021-05-03 | End: 2021-07-21

## 2021-05-03 RX ORDER — ATOMOXETINE 25 MG/1
25 CAPSULE ORAL DAILY
COMMUNITY
Start: 2021-04-06 | End: 2022-11-22

## 2021-05-03 RX ORDER — FAMOTIDINE 20 MG/1
20 TABLET, FILM COATED ORAL 2 TIMES DAILY
Qty: 60 TABLET | Refills: 0 | Status: SHIPPED | OUTPATIENT
Start: 2021-05-03 | End: 2021-06-02

## 2021-05-10 ENCOUNTER — OFFICE VISIT (OUTPATIENT)
Dept: OBSTETRICS AND GYNECOLOGY | Age: 21
End: 2021-05-10

## 2021-05-10 VITALS
DIASTOLIC BLOOD PRESSURE: 80 MMHG | HEIGHT: 65 IN | WEIGHT: 234 LBS | BODY MASS INDEX: 38.99 KG/M2 | SYSTOLIC BLOOD PRESSURE: 136 MMHG

## 2021-05-10 DIAGNOSIS — R30.0 DYSURIA: Primary | ICD-10-CM

## 2021-05-10 LAB
BILIRUB BLD-MCNC: NEGATIVE MG/DL
CLARITY, POC: CLEAR
COLOR UR: YELLOW
GLUCOSE UR STRIP-MCNC: NEGATIVE MG/DL
KETONES UR QL: NEGATIVE
LEUKOCYTE EST, POC: ABNORMAL
NITRITE UR-MCNC: NEGATIVE MG/ML
PH UR: 5.5 [PH] (ref 5–8)
PROT UR STRIP-MCNC: NEGATIVE MG/DL
RBC # UR STRIP: ABNORMAL /UL
SP GR UR: 1.02 (ref 1–1.03)
UROBILINOGEN UR QL: NORMAL

## 2021-05-10 PROCEDURE — 99213 OFFICE O/P EST LOW 20 MIN: CPT | Performed by: NURSE PRACTITIONER

## 2021-05-10 RX ORDER — NITROFURANTOIN 25; 75 MG/1; MG/1
100 CAPSULE ORAL 2 TIMES DAILY
Qty: 14 CAPSULE | Refills: 0 | Status: SHIPPED | OUTPATIENT
Start: 2021-05-10 | End: 2021-05-17

## 2021-05-10 RX ORDER — LEVONORGESTREL AND ETHINYL ESTRADIOL 0.1-0.02MG
1 KIT ORAL DAILY
Qty: 90 TABLET | Refills: 3 | Status: SHIPPED | OUTPATIENT
Start: 2021-05-10 | End: 2021-09-14

## 2021-05-10 NOTE — PROGRESS NOTES
"Subjective   Sita Amaya is a 20 y.o. female.     History of Present Illness     Sita presents with complaint of intermittent mild burning with urination and frequency x 1 week.   She has had UTI in the past but none recently. Denies new partners. Denies vaginal sxs, fevers/chills.    Also requests to start OCPs. On menses now.     The following portions of the patient's history were reviewed and updated as appropriate: allergies, current medications, past family history, past medical history, past social history, past surgical history and problem list.    Review of Systems   Constitutional: Negative for chills, fatigue and fever.   Gastrointestinal: Negative for abdominal distention and abdominal pain.   Genitourinary: Positive for dysuria and frequency. Negative for decreased urine volume, difficulty urinating, dyspareunia, flank pain, genital sores, hematuria, menstrual problem, pelvic pain, pelvic pressure, urgency, urinary incontinence, vaginal bleeding, vaginal discharge and vaginal pain.       Objective   Physical Exam  Constitutional:       Appearance: Normal appearance. She is not ill-appearing.   Skin:     General: Skin is warm and dry.   Neurological:      General: No focal deficit present.      Mental Status: She is alert and oriented to person, place, and time.   Psychiatric:         Mood and Affect: Mood normal.         Behavior: Behavior normal.         /80   Ht 165.1 cm (65\")   Wt 106 kg (234 lb)   LMP 05/09/2021 (Exact Date)   Breastfeeding No   BMI 38.94 kg/m²         Assessment/Plan   Diagnoses and all orders for this visit:    1. Dysuria (Primary)  -     POC Urinalysis Dipstick  -     Chlamydia trachomatis, Neisseria gonorrhoeae, Trichomonas vaginalis, PCR - Urine, Urine, Clean Catch    Other orders  -     nitrofurantoin, macrocrystal-monohydrate, (Macrobid) 100 MG capsule; Take 1 capsule by mouth 2 (Two) Times a Day for 7 days.  Dispense: 14 capsule; Refill: 0  -     " levonorgestrel-ethinyl estradiol (Lutera) 0.1-20 MG-MCG per tablet; Take 1 tablet by mouth Daily.  Dispense: 90 tablet; Refill: 3      Will treat with macrobid while awaiting culture. Advised to call if symptoms worsen or persist.  OCPs sent to pharmacy. On menses so will start now.  Follow up for annual exam and prn    ANA Bourgeois

## 2021-05-12 LAB
C TRACH RRNA SPEC QL NAA+PROBE: NEGATIVE
N GONORRHOEA RRNA SPEC QL NAA+PROBE: NEGATIVE
T VAGINALIS DNA SPEC QL NAA+PROBE: NEGATIVE

## 2021-05-13 LAB
BACTERIA UR CULT: ABNORMAL
BACTERIA UR CULT: ABNORMAL
OTHER ANTIBIOTIC SUSC ISLT: ABNORMAL

## 2021-06-07 DIAGNOSIS — E53.8 B12 DEFICIENCY: Primary | ICD-10-CM

## 2021-06-07 DIAGNOSIS — Z00.00 ROUTINE ADULT HEALTH MAINTENANCE: ICD-10-CM

## 2021-06-07 DIAGNOSIS — E55.9 VITAMIN D DEFICIENCY: ICD-10-CM

## 2021-06-08 LAB
25(OH)D3+25(OH)D2 SERPL-MCNC: 25.3 NG/ML (ref 30–100)
ALBUMIN SERPL-MCNC: 4.5 G/DL (ref 3.5–5.2)
ALBUMIN/GLOB SERPL: 1.7 G/DL
ALP SERPL-CCNC: 70 U/L (ref 39–117)
ALT SERPL-CCNC: 20 U/L (ref 1–33)
AST SERPL-CCNC: 17 U/L (ref 1–32)
BASOPHILS # BLD AUTO: 0.07 10*3/MM3 (ref 0–0.2)
BASOPHILS NFR BLD AUTO: 0.8 % (ref 0–1.5)
BILIRUB SERPL-MCNC: 0.2 MG/DL (ref 0–1.2)
BUN SERPL-MCNC: 10 MG/DL (ref 6–20)
BUN/CREAT SERPL: 15.6 (ref 7–25)
CALCIUM SERPL-MCNC: 9.9 MG/DL (ref 8.6–10.5)
CHLORIDE SERPL-SCNC: 104 MMOL/L (ref 98–107)
CHOLEST SERPL-MCNC: 158 MG/DL (ref 0–200)
CO2 SERPL-SCNC: 24.7 MMOL/L (ref 22–29)
CREAT SERPL-MCNC: 0.64 MG/DL (ref 0.57–1)
EOSINOPHIL # BLD AUTO: 0.09 10*3/MM3 (ref 0–0.4)
EOSINOPHIL NFR BLD AUTO: 1.1 % (ref 0.3–6.2)
ERYTHROCYTE [DISTWIDTH] IN BLOOD BY AUTOMATED COUNT: 13 % (ref 12.3–15.4)
FOLATE SERPL-MCNC: 3.64 NG/ML (ref 4.78–24.2)
GLOBULIN SER CALC-MCNC: 2.6 GM/DL
GLUCOSE SERPL-MCNC: 82 MG/DL (ref 65–99)
HCT VFR BLD AUTO: 39.6 % (ref 34–46.6)
HDLC SERPL-MCNC: 55 MG/DL (ref 40–60)
HGB BLD-MCNC: 13.2 G/DL (ref 12–15.9)
IMM GRANULOCYTES # BLD AUTO: 0.01 10*3/MM3 (ref 0–0.05)
IMM GRANULOCYTES NFR BLD AUTO: 0.1 % (ref 0–0.5)
LDLC SERPL CALC-MCNC: 83 MG/DL (ref 0–100)
LDLC/HDLC SERPL: 1.47 {RATIO}
LYMPHOCYTES # BLD AUTO: 2.91 10*3/MM3 (ref 0.7–3.1)
LYMPHOCYTES NFR BLD AUTO: 35.2 % (ref 19.6–45.3)
MCH RBC QN AUTO: 29.1 PG (ref 26.6–33)
MCHC RBC AUTO-ENTMCNC: 33.3 G/DL (ref 31.5–35.7)
MCV RBC AUTO: 87.2 FL (ref 79–97)
MONOCYTES # BLD AUTO: 0.52 10*3/MM3 (ref 0.1–0.9)
MONOCYTES NFR BLD AUTO: 6.3 % (ref 5–12)
NEUTROPHILS # BLD AUTO: 4.67 10*3/MM3 (ref 1.7–7)
NEUTROPHILS NFR BLD AUTO: 56.5 % (ref 42.7–76)
NRBC BLD AUTO-RTO: 0 /100 WBC (ref 0–0.2)
PLATELET # BLD AUTO: 367 10*3/MM3 (ref 140–450)
POTASSIUM SERPL-SCNC: 4.3 MMOL/L (ref 3.5–5.2)
PROT SERPL-MCNC: 7.1 G/DL (ref 6–8.5)
RBC # BLD AUTO: 4.54 10*6/MM3 (ref 3.77–5.28)
SODIUM SERPL-SCNC: 137 MMOL/L (ref 136–145)
T3FREE SERPL-MCNC: 3.2 PG/ML (ref 2–4.4)
T4 FREE SERPL-MCNC: 1.03 NG/DL (ref 0.93–1.7)
TRIGL SERPL-MCNC: 112 MG/DL (ref 0–150)
TSH SERPL DL<=0.005 MIU/L-ACNC: 2.64 UIU/ML (ref 0.27–4.2)
UNABLE TO VOID: NORMAL
VIT B12 SERPL-MCNC: 345 PG/ML (ref 211–946)
VLDLC SERPL CALC-MCNC: 20 MG/DL (ref 5–40)
WBC # BLD AUTO: 8.27 10*3/MM3 (ref 3.4–10.8)

## 2021-07-21 ENCOUNTER — OFFICE VISIT (OUTPATIENT)
Dept: OBSTETRICS AND GYNECOLOGY | Age: 21
End: 2021-07-21

## 2021-07-21 VITALS
SYSTOLIC BLOOD PRESSURE: 118 MMHG | HEIGHT: 64 IN | DIASTOLIC BLOOD PRESSURE: 70 MMHG | BODY MASS INDEX: 39.78 KG/M2 | WEIGHT: 233 LBS

## 2021-07-21 DIAGNOSIS — Z01.419 WELL WOMAN EXAM WITH ROUTINE GYNECOLOGICAL EXAM: Primary | ICD-10-CM

## 2021-07-21 DIAGNOSIS — Z11.3 SCREEN FOR STD (SEXUALLY TRANSMITTED DISEASE): ICD-10-CM

## 2021-07-21 DIAGNOSIS — R63.5 WEIGHT GAIN: ICD-10-CM

## 2021-07-21 PROCEDURE — 99395 PREV VISIT EST AGE 18-39: CPT | Performed by: PHYSICIAN ASSISTANT

## 2021-07-21 RX ORDER — PROPRANOLOL HYDROCHLORIDE 10 MG/1
TABLET ORAL
COMMUNITY
Start: 2021-07-05 | End: 2022-10-19

## 2021-07-21 NOTE — PROGRESS NOTES
Subjective     Chief Complaint   Patient presents with   • Gynecologic Exam     annual exam last pap 2020 neg/no hpv       History of Present Illness    Sita Amaya is a 21 y.o.  who presents for annual exam.    Pt here for her annual exam    She is trying to lose weight   Took out nexplanon in hopes that she would have weight loss  Is frustrated that she is gaining rather then loosing  Is on the BCP but does want to get pregnant soon  In committed relationship, together 3 years    Works in     Pt of Dr Ferrer  Her menses are regular every 28-30 days, lasting 4-7 days, dysmenorrhea none   Obstetric History:  OB History        0    Para   0    Term   0       0    AB   0    Living   0       SAB   0    TAB   0    Ectopic   0    Molar   0    Multiple   0    Live Births   0               Menstrual History:     No LMP recorded (lmp unknown).         Current contraception: OCP (estrogen/progesterone)  History of abnormal Pap smear: no  Received Gardasil immunization: not asked  Perform regular self breast exam: yes - occl  Family history of uterine or ovarian cancer: no  Family History of colon cancer: no  Family history of breast cancer: no    Mammogram: not indicated.  Colonoscopy: not indicated.  DEXA: not indicated.    Exercise: not active  Calcium/Vitamin D: adequate intake    The following portions of the patient's history were reviewed and updated as appropriate: allergies, current medications, past family history, past medical history, past social history, past surgical history and problem list.    Review of Systems   All other systems reviewed and are negative.      Review of Systems   Constitutional: Negative for fatigue.   Respiratory: Negative for shortness of breath.    Gastrointestinal: Negative for abdominal pain.   Genitourinary: Negative for dysuria.   Neurological: Negative for headaches.   Psychiatric/Behavioral: Negative for dysphoric mood.         Objective  "  Physical Exam    /70   Ht 162.6 cm (64\")   Wt 106 kg (233 lb)   LMP  (LMP Unknown)   Breastfeeding No   BMI 39.99 kg/m²   General:   alert, comfortable and no distress   Heart: regular rate and rhythm   Lungs: clear to auscultation bilaterally   Breast: normal appearance, no masses or tenderness, Inspection negative, No nipple retraction or dimpling, No nipple discharge or bleeding, No axillary or supraclavicular adenopathy, Normal to palpation without dominant masses   Neck: no adenopathy and no carotid bruit   Abdomen: {normal findings: soft, non-tender   CVA: Not performed today   Pelvis: External genitalia: normal general appearance  Vaginal: normal mucosa without prolapse or lesions  Cervix: normal appearance and GC prep obtained  Adnexa: normal bimanual exam  Uterus: normal single, nontender  Exam limited by body habitus   Extremities: Not performed today   Neurologic: negative   Psychiatric: Normal affect, judgement, and mood, anxious     Assessment/Plan   Diagnoses and all orders for this visit:    1. Well woman exam with routine gynecological exam (Primary)    2. Weight gain    3. Screen for STD (sexually transmitted disease)  -     Chlamydia trachomatis, Neisseria gonorrhoeae, PCR - Swab, Cervix    Other orders  -     metFORMIN (Glucophage) 500 MG tablet; Take 1 tablet by mouth 3 (Three) Times a Day.  Dispense: 90 tablet; Refill: 3        All questions answered.  Breast self exam technique reviewed and patient encouraged to perform self-exam monthly.  Discussed healthy lifestyle modifications.  Recommended 30 minutes of aerobic exercise five times per week.  Discussed calcium needs to prevent osteoporosis.    Pap utd  Std testing done  Disc diet changes, inc exercising. Will add metformin to help with PCOS sx's and see if it aids in weight loss  Enc to disc weight loss medication with PCP as well, pt says she did ask and they said they don't do that  Disc nutrition counseling as well   "

## 2021-07-23 LAB
C TRACH RRNA SPEC QL NAA+PROBE: NEGATIVE
N GONORRHOEA RRNA SPEC QL NAA+PROBE: NEGATIVE

## 2021-08-03 ENCOUNTER — OFFICE VISIT (OUTPATIENT)
Dept: FAMILY MEDICINE CLINIC | Facility: CLINIC | Age: 21
End: 2021-08-03

## 2021-08-03 ENCOUNTER — HOSPITAL ENCOUNTER (OUTPATIENT)
Dept: ULTRASOUND IMAGING | Facility: HOSPITAL | Age: 21
Discharge: HOME | End: 2021-08-03
Admitting: PHYSICIAN ASSISTANT

## 2021-08-03 VITALS
SYSTOLIC BLOOD PRESSURE: 122 MMHG | HEIGHT: 64 IN | TEMPERATURE: 100.2 F | WEIGHT: 237 LBS | BODY MASS INDEX: 40.46 KG/M2 | RESPIRATION RATE: 18 BRPM | DIASTOLIC BLOOD PRESSURE: 76 MMHG

## 2021-08-03 DIAGNOSIS — R10.11 RIGHT UPPER QUADRANT PAIN: Primary | ICD-10-CM

## 2021-08-03 DIAGNOSIS — R11.0 NAUSEA: ICD-10-CM

## 2021-08-03 DIAGNOSIS — K80.19 CALCULUS OF GALLBLADDER WITH CHOLECYSTITIS OF OTHER ACUITY WITH OBSTRUCTION: ICD-10-CM

## 2021-08-03 DIAGNOSIS — R50.9 FEBRILE ILLNESS: ICD-10-CM

## 2021-08-03 DIAGNOSIS — R82.90 ABNORMAL URINALYSIS: ICD-10-CM

## 2021-08-03 DIAGNOSIS — R10.11 RIGHT UPPER QUADRANT PAIN: ICD-10-CM

## 2021-08-03 LAB
BILIRUB BLD-MCNC: NEGATIVE MG/DL
CLARITY, POC: ABNORMAL
COLOR UR: ABNORMAL
EXPIRATION DATE: NORMAL
GLUCOSE UR STRIP-MCNC: NEGATIVE MG/DL
INTERNAL CONTROL: NORMAL
KETONES UR QL: NEGATIVE
LEUKOCYTE EST, POC: ABNORMAL
Lab: NORMAL
NITRITE UR-MCNC: NEGATIVE MG/ML
PH UR: 6.5 [PH] (ref 5–8)
PROT UR STRIP-MCNC: NEGATIVE MG/DL
RBC # UR STRIP: NEGATIVE /UL
S PYO AG THROAT QL: NEGATIVE
SP GR UR: 1.02 (ref 1–1.03)
UROBILINOGEN UR QL: NORMAL

## 2021-08-03 PROCEDURE — 99213 OFFICE O/P EST LOW 20 MIN: CPT | Performed by: PHYSICIAN ASSISTANT

## 2021-08-03 PROCEDURE — 87880 STREP A ASSAY W/OPTIC: CPT | Performed by: PHYSICIAN ASSISTANT

## 2021-08-03 PROCEDURE — 76705 ECHO EXAM OF ABDOMEN: CPT

## 2021-08-03 NOTE — PROGRESS NOTES
Subjective   Sita Amaya is a 21 y.o. female who presents today for evaluation of abdominal pain and nausea.     History of Present Illness     Yesterday- had meatloaf,macaroni and cheese, and scalloped potatoes.   Patient reports at 12-1 am last night, she started with stabbing, constant pain RUQ with radiation down right side of abdomen. She has pain now. Almost 10/10. Nausea without vomiting since 3-4 am. Diarrhea with metformin but no other diarrhea. No blood in stool. Did not realize had a fever. Has been clingy to her aunt and usually only happens when she is sick.     The following portions of the patient's history were reviewed and updated as appropriate: allergies, current medications, past family history, past medical history, past social history, past surgical history and problem list.    Review of Systems   Constitutional: Positive for fever.   HENT: Negative.    Respiratory: Negative.    Cardiovascular: Negative.    Gastrointestinal: Positive for abdominal pain and vomiting.   Genitourinary: Negative.    Neurological: Negative.    Psychiatric/Behavioral: The patient is nervous/anxious.        Objective   Vitals:    08/03/21 0858   BP: 122/76   Resp: 18   Temp: 100.2 °F (37.9 °C)     Body mass index is 40.68 kg/m².    Physical Exam  Vitals and nursing note reviewed.   Constitutional:       Appearance: Normal appearance. She is well-developed. She is not diaphoretic.   HENT:      Head: Normocephalic and atraumatic.      Right Ear: External ear normal.      Left Ear: External ear normal.      Nose: Nose normal.   Eyes:      General: Lids are normal.      Conjunctiva/sclera: Conjunctivae normal.   Neck:      Vascular: No carotid bruit.   Cardiovascular:      Rate and Rhythm: Normal rate and regular rhythm.      Heart sounds: Normal heart sounds. No murmur heard.   No friction rub. No gallop.    Pulmonary:      Effort: Pulmonary effort is normal. No respiratory distress.      Breath sounds: Normal breath  sounds. No wheezing, rhonchi or rales.   Abdominal:      General: Bowel sounds are normal. There is no distension or abdominal bruit.      Palpations: Abdomen is soft. Abdomen is not rigid. There is no shifting dullness or mass.      Tenderness: There is generalized abdominal tenderness (most tender RUQ). There is guarding. There is no rebound. Positive signs include Madera's sign.      Hernia: No hernia is present.   Musculoskeletal:         General: No deformity.      Cervical back: Neck supple.   Skin:     General: Skin is warm and dry.   Neurological:      Mental Status: She is alert and oriented to person, place, and time.      Gait: Gait normal.   Psychiatric:         Mood and Affect: Mood is anxious. Affect is tearful.         Cognition and Memory: Cognition is impaired.         Assessment/Plan   Diagnoses and all orders for this visit:    1. Right upper quadrant pain (Primary)  -     POCT urinalysis dipstick, automated  -     US Gallbladder; Future  -     Ambulatory Referral to General Surgery    2. Nausea  -     US Gallbladder; Future  -     POC Rapid Strep A  -     COVID-19,[facility] ROUTINE, NP/OP SWAB IN TRANSPORT MEDIA OR ESWAB 72 HR TAT - Swab, Nasopharynx  -     Ambulatory Referral to General Surgery    3. Calculus of gallbladder with cholecystitis of other acuity with obstruction  -     US Gallbladder; Future  -     Ambulatory Referral to General Surgery    4. Abnormal urinalysis  -     Urine Culture - Urine, Urine, Clean Catch  -     Ambulatory Referral to General Surgery    5. Febrile illness  -     Urine Culture - Urine, Urine, Clean Catch  -     US Gallbladder; Future  -     POC Rapid Strep A  -     COVID-19,[facility] ROUTINE, NP/OP SWAB IN TRANSPORT MEDIA OR ESWAB 72 HR TAT - Swab, Nasopharynx  -     Ambulatory Referral to General Surgery        Assessment and Plan  Patient has had episodes of abdominal pain and urinary tract infection.  She was seen in the emergency department 5/2021 and had CT  abdomen pelvis with cholelithiasis.  She presents today with acute onset abdominal pain at 12 AM to 1 AM this morning continues with intractable pain.  She also has some nausea and mild fever.  Patient does work at a  and has some exposure to children with illnesses.  Negative strep testing today.  I will check COVID-19 testing.  Patient referred for stat right upper quadrant ultrasound.  She will need to go to the emergency department ASAP if she has worsening, new or changing symptoms.    Gallbladder ultrasound with contracted gallbladder with numerous cholelithiasis without cholecystitis or biliary dilatation.  I advised I will refer to general surgery for urgent evaluation.  To ER ASAP if she has persistent, worsening, new or changing symptoms prior to general surgery appointment.  She should also monitor her temperature and ensure fever resolves.    I spent 25 minutes caring for Sita Amaya on this date of service. This time includes time spent by me in the following activities as necessary: preparing for the visit, reviewing tests, specialists records and previous visits, obtaining and/or reviewing a separately obtained history, performing a medically appropriate exam and/or evaluation, counseling and educating the patient, family, caregiver, referring and/or communicating with other healthcare professionals, documenting information in the medical record, independently interpreting results and communicating that information with the patient, family, caregiver, and developing a medically appropriate treatment plan with consideration of other conditions, medications, and treatments.

## 2021-08-04 LAB
LABCORP SARS-COV-2, NAA 2 DAY TAT: NORMAL
SARS-COV-2 RNA RESP QL NAA+PROBE: NOT DETECTED

## 2021-08-05 LAB
BACTERIA UR CULT: NORMAL
BACTERIA UR CULT: NORMAL

## 2021-09-07 ENCOUNTER — OFFICE VISIT (OUTPATIENT)
Dept: OBSTETRICS AND GYNECOLOGY | Age: 21
End: 2021-09-07

## 2021-09-07 VITALS
DIASTOLIC BLOOD PRESSURE: 74 MMHG | HEIGHT: 64 IN | WEIGHT: 234 LBS | BODY MASS INDEX: 39.95 KG/M2 | SYSTOLIC BLOOD PRESSURE: 120 MMHG

## 2021-09-07 DIAGNOSIS — Z13.89 SCREENING FOR BLOOD OR PROTEIN IN URINE: ICD-10-CM

## 2021-09-07 DIAGNOSIS — L70.8 OTHER ACNE: ICD-10-CM

## 2021-09-07 DIAGNOSIS — Q51.3 BICORNATE UTERUS: ICD-10-CM

## 2021-09-07 DIAGNOSIS — Z13.9 SPECIAL SCREENING: ICD-10-CM

## 2021-09-07 DIAGNOSIS — L68.0 HIRSUTISM: ICD-10-CM

## 2021-09-07 DIAGNOSIS — Z30.09 ENCOUNTER FOR OTHER GENERAL COUNSELING OR ADVICE ON CONTRACEPTION: Primary | ICD-10-CM

## 2021-09-07 PROBLEM — N93.9 ABNORMAL UTERINE BLEEDING (AUB): Status: ACTIVE | Noted: 2021-09-07

## 2021-09-07 LAB
B-HCG UR QL: NEGATIVE
BILIRUB BLD-MCNC: NEGATIVE MG/DL
GLUCOSE UR STRIP-MCNC: NEGATIVE MG/DL
INTERNAL NEGATIVE CONTROL: NEGATIVE
INTERNAL POSITIVE CONTROL: NORMAL
KETONES UR QL: NEGATIVE
LEUKOCYTE EST, POC: NEGATIVE
Lab: NORMAL
NITRITE UR-MCNC: NEGATIVE MG/ML
PH UR: 5 [PH] (ref 5–8)
PROT UR STRIP-MCNC: NEGATIVE MG/DL
RBC # UR STRIP: ABNORMAL /UL
SP GR UR: 1.3 (ref 1–1.03)
UROBILINOGEN UR QL: NORMAL

## 2021-09-07 PROCEDURE — 81025 URINE PREGNANCY TEST: CPT | Performed by: OBSTETRICS & GYNECOLOGY

## 2021-09-07 PROCEDURE — 99213 OFFICE O/P EST LOW 20 MIN: CPT | Performed by: OBSTETRICS & GYNECOLOGY

## 2021-09-07 NOTE — PROGRESS NOTES
"Subjective   Sita Amaya is a 21 y.o. female CC: patient here to discuss another type of   bcp , patient is on lutera bcp since march - periods are heavy but  regular - does not like the pill  , reports never missing a pill -  pt previously on nexplanon and kyleena - reports not liking iud was poking her and nexplanon was removed due to  planning having kids - plans changed , last pap and vag swab neg 6/30/21 . Discussed all options and patient would like to try the Nexplanon again.     History of Present Illness    The following portions of the patient's history were reviewed and updated as appropriate: allergies, current medications, past family history, past medical history, past social history, past surgical history and problem list.    Review of Systems   Constitutional: Negative for chills, fatigue and fever.   Gastrointestinal: Negative for abdominal distention and abdominal pain.   Genitourinary: Positive for menstrual problem. Negative for dysuria, pelvic pain, vaginal bleeding, vaginal discharge and vaginal pain.   All other systems reviewed and are negative.  /74   Ht 162.6 cm (64\")   Wt 106 kg (234 lb)   LMP 07/21/2021 (Approximate)   Breastfeeding No   BMI 40.17 kg/m²       Objective   Physical Exam  Vitals and nursing note reviewed.   Constitutional:       Appearance: Normal appearance. She is obese.   Pulmonary:      Effort: Pulmonary effort is normal.   Neurological:      Mental Status: She is alert and oriented to person, place, and time.   Psychiatric:         Mood and Affect: Mood normal.         Behavior: Behavior normal.         Assessment/Plan   Diagnoses and all orders for this visit:    1. Encounter for other general counseling or advice on contraception (Primary)    2. Special screening  -     POC Pregnancy, Urine    3. Screening for blood or protein in urine  -     POC Urinalysis Dipstick    4. Bicornate uterus    5. Other acne    6. Hirsutism     Counseling was given to " patient for the following topics: impressions, risks and benefits of treatment options and importance of treatment compliance . Total time of the encounter was 20 minutes and 15 minutes was spent counseling.

## 2021-09-14 ENCOUNTER — PROCEDURE VISIT (OUTPATIENT)
Dept: OBSTETRICS AND GYNECOLOGY | Age: 21
End: 2021-09-14

## 2021-09-14 VITALS
WEIGHT: 230 LBS | BODY MASS INDEX: 39.27 KG/M2 | HEIGHT: 64 IN | SYSTOLIC BLOOD PRESSURE: 110 MMHG | DIASTOLIC BLOOD PRESSURE: 74 MMHG

## 2021-09-14 DIAGNOSIS — Z13.9 SPECIAL SCREENING: ICD-10-CM

## 2021-09-14 DIAGNOSIS — N94.6 DYSMENORRHEA: ICD-10-CM

## 2021-09-14 DIAGNOSIS — N93.9 ABNORMAL UTERINE BLEEDING (AUB): ICD-10-CM

## 2021-09-14 DIAGNOSIS — Z30.017 NEXPLANON INSERTION: Primary | ICD-10-CM

## 2021-09-14 PROCEDURE — 11981 INSERTION DRUG DLVR IMPLANT: CPT | Performed by: OBSTETRICS & GYNECOLOGY

## 2021-09-14 PROCEDURE — 81025 URINE PREGNANCY TEST: CPT | Performed by: OBSTETRICS & GYNECOLOGY

## 2021-09-14 NOTE — PROGRESS NOTES
Procedure   Insert Drug Implant    Date/Time: 9/14/2021 11:09 AM  Performed by: Daniella Ferrer MD  Authorized by: Daniella Ferrer MD   Preparation: Patient was prepped and draped in the usual sterile fashion.  Local anesthesia used: yes  Anesthesia: local infiltration    Anesthesia:  Local anesthesia used: yes  Local Anesthetic: lidocaine 1% with epinephrine  Anesthetic total: 5 mL    Sedation:  Patient sedated: no    Patient tolerance: Patient tolerated the procedure well with no immediate complications  Comments: Alcohol swab to left arm approximately 6cm from medial epicondyle - lidocaine injected - hibaclens x 2 - Nexplanon inserted without difficulty.  Band-aid placed and arm wrapped with ace bandage.

## 2021-10-26 ENCOUNTER — TELEPHONE (OUTPATIENT)
Dept: OBSTETRICS AND GYNECOLOGY | Age: 21
End: 2021-10-26

## 2021-10-26 NOTE — TELEPHONE ENCOUNTER
Pt called states that she had nexplanon put in a over a month ago, she can not feel it in her arm and it is making her concerned. She thinks that it is working though because she has not had a period since.

## 2021-11-02 ENCOUNTER — OFFICE VISIT (OUTPATIENT)
Dept: OBSTETRICS AND GYNECOLOGY | Age: 21
End: 2021-11-02

## 2021-11-02 VITALS — HEIGHT: 64 IN | WEIGHT: 226 LBS | BODY MASS INDEX: 38.58 KG/M2

## 2021-11-02 DIAGNOSIS — Z30.46 ENCOUNTER FOR SURVEILLANCE OF IMPLANTABLE SUBDERMAL CONTRACEPTIVE: Primary | ICD-10-CM

## 2021-11-02 PROBLEM — Z97.5 NEXPLANON IN PLACE: Status: ACTIVE | Noted: 2021-11-02

## 2021-11-02 PROCEDURE — 99213 OFFICE O/P EST LOW 20 MIN: CPT | Performed by: OBSTETRICS & GYNECOLOGY

## 2021-11-02 RX ORDER — ETONOGESTREL 68 MG/1
1 IMPLANT SUBCUTANEOUS ONCE
COMMUNITY
End: 2022-11-08

## 2021-11-02 NOTE — PROGRESS NOTES
Subjective   Sita Amaya is a 21 y.o. female cc; issues with nexplanon which was inserted 9/14/21 , pt c/o of not feeling the nexplanon.     History of Present Illness    The following portions of the patient's history were reviewed and updated as appropriate: allergies, current medications, past family history, past medical history, past social history, past surgical history and problem list.    Review of Systems   Constitutional: Negative for chills, fatigue and fever.   Gastrointestinal: Negative for abdominal distention and abdominal pain.   Genitourinary: Negative for dysuria, vaginal bleeding, vaginal discharge and vaginal pain.   All other systems reviewed and are negative.      Objective   Physical Exam  Vitals and nursing note reviewed.   Constitutional:       Appearance: Normal appearance. She is obese.   Pulmonary:      Effort: Pulmonary effort is normal.   Musculoskeletal:      Left forearm: No edema or tenderness.        Arms:       Comments: Nexplanon easily palpated in left arm - patient also able to feel it when guided     Neurological:      Mental Status: She is alert.   Psychiatric:         Mood and Affect: Mood normal.         Behavior: Behavior normal.         Assessment/Plan   Diagnoses and all orders for this visit:    1. Encounter for surveillance of implantable subdermal contraceptive (Primary)         Counseling was given to patient for the following topics: instructions for management and impressions . Total time of the encounter was 20 minutes and 15 minutes was spent counseling.

## 2021-12-15 ENCOUNTER — OFFICE VISIT (OUTPATIENT)
Dept: FAMILY MEDICINE CLINIC | Facility: CLINIC | Age: 21
End: 2021-12-15

## 2021-12-15 VITALS
OXYGEN SATURATION: 97 % | BODY MASS INDEX: 38.58 KG/M2 | SYSTOLIC BLOOD PRESSURE: 104 MMHG | DIASTOLIC BLOOD PRESSURE: 62 MMHG | WEIGHT: 226 LBS | RESPIRATION RATE: 18 BRPM | HEIGHT: 64 IN | TEMPERATURE: 98.3 F | HEART RATE: 76 BPM

## 2021-12-15 DIAGNOSIS — M25.562 ACUTE PAIN OF LEFT KNEE: ICD-10-CM

## 2021-12-15 DIAGNOSIS — M79.642 LEFT HAND PAIN: ICD-10-CM

## 2021-12-15 DIAGNOSIS — M79.632 LEFT FOREARM PAIN: ICD-10-CM

## 2021-12-15 DIAGNOSIS — M25.532 LEFT WRIST PAIN: ICD-10-CM
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
## 2021-12-15 PROCEDURE — 99214 OFFICE O/P EST MOD 30 MIN: CPT | Performed by: PHYSICIAN ASSISTANT

## 2021-12-15 RX ORDER — LAMOTRIGINE 25 MG/1
100 TABLET ORAL
COMMUNITY
Start: 2021-11-23 | End: 2022-10-19

## 2021-12-15 RX ORDER — FLUOXETINE HYDROCHLORIDE 40 MG/1
CAPSULE ORAL
COMMUNITY
Start: 2021-11-17 | End: 2021-12-15

## 2021-12-20 ENCOUNTER — TELEPHONE (OUTPATIENT)
Dept: FAMILY MEDICINE CLINIC | Facility: CLINIC | Age: 21
End: 2021-12-20

## 2021-12-20 NOTE — TELEPHONE ENCOUNTER
Caller: Sita Amaya    Relationship: Self    Best call back number: [phone] (H)    What test was performed: X RAY    When was the test performed: 12/15/21    Where was the test performed: [facility]    Additional notes:

## 2021-12-21 NOTE — TELEPHONE ENCOUNTER
Please print the orders and ensure they are attached to our orders in the chart.  I will complete result note once they have been received.

## 2022-01-25 ENCOUNTER — OFFICE VISIT (OUTPATIENT)
Dept: OBSTETRICS AND GYNECOLOGY | Age: 22
End: 2022-01-25

## 2022-01-25 VITALS — WEIGHT: 230 LBS | BODY MASS INDEX: 39.48 KG/M2 | SYSTOLIC BLOOD PRESSURE: 110 MMHG | DIASTOLIC BLOOD PRESSURE: 70 MMHG

## 2022-01-25 DIAGNOSIS — Z13.89 SCREENING FOR BLOOD OR PROTEIN IN URINE: ICD-10-CM

## 2022-01-25 DIAGNOSIS — R35.0 URINARY FREQUENCY: ICD-10-CM

## 2022-01-25 DIAGNOSIS — Z97.5 NEXPLANON IN PLACE: ICD-10-CM

## 2022-01-25 DIAGNOSIS — N89.8 VAGINAL DISCHARGE: ICD-10-CM

## 2022-01-25 DIAGNOSIS — Z97.5 BREAKTHROUGH BLEEDING ON NEXPLANON: Primary | ICD-10-CM

## 2022-01-25 DIAGNOSIS — N92.1 BREAKTHROUGH BLEEDING ON NEXPLANON: Primary | ICD-10-CM

## 2022-01-25 LAB
BILIRUB BLD-MCNC: NEGATIVE MG/DL
GLUCOSE UR STRIP-MCNC: NEGATIVE MG/DL
KETONES UR QL: ABNORMAL
LEUKOCYTE EST, POC: ABNORMAL
NITRITE UR-MCNC: NEGATIVE MG/ML
PH UR: 5.5 [PH] (ref 5–8)
PROT UR STRIP-MCNC: ABNORMAL MG/DL
RBC # UR STRIP: NEGATIVE /UL
SP GR UR: 1.02 (ref 1–1.03)
UROBILINOGEN UR QL: NORMAL

## 2022-01-25 PROCEDURE — 99213 OFFICE O/P EST LOW 20 MIN: CPT | Performed by: OBSTETRICS & GYNECOLOGY

## 2022-01-25 NOTE — PROGRESS NOTES
Subjective   Sita Amaya is a 21 y.o. female presents to remove her nexplanon - inserted 9/14/21 grandma is with patient - she says patient guardian mother does not want patient to remove the nexplanon - patient wants to remove it  because she is not feeling the device and c/o having periods on it 2 last yr- pt grandmother reports saying patient has autism and does not makes right decisions- today c/o urgency and frequency , urinating with pain and vag odor - itching , last pap and vag swab  6/30/21 neg - pt has a new boyfriend.      Discussed options and patient willing to try ocp to stop bleeding.       History of Present Illness    The following portions of the patient's history were reviewed and updated as appropriate: allergies, current medications, past family history, past medical history, past social history, past surgical history and problem list.    Review of Systems   Constitutional: Negative for chills, fatigue and fever.   Gastrointestinal: Negative for abdominal distention and abdominal pain.   Genitourinary: Positive for dysuria, frequency, urgency and vaginal discharge. Negative for vaginal bleeding and vaginal pain.   All other systems reviewed and are negative.  /70   Wt 104 kg (230 lb)   LMP 11/24/2021 (Approximate)   Breastfeeding No   BMI 39.48 kg/m²     Objective   Physical Exam  Vitals reviewed.   Constitutional:       Appearance: Normal appearance. She is obese.   Pulmonary:      Effort: Pulmonary effort is normal.   Genitourinary:     General: Normal vulva.      Exam position: Lithotomy position.      Labia:         Right: No rash or lesion.         Left: No rash or lesion.       Vagina: Vaginal discharge present. No bleeding.      Cervix: No lesion or cervical bleeding.   Neurological:      Mental Status: She is alert and oriented to person, place, and time.   Psychiatric:         Mood and Affect: Mood normal.         Behavior: Behavior normal.         Assessment/Plan    Diagnoses and all orders for this visit:    1. Breakthrough bleeding on Nexplanon (Primary)    2. Screening for blood or protein in urine  -     POC Urinalysis Dipstick    3. Vaginal discharge  -     NuSwab VG+ - Swab, Vagina    4. Urinary frequency  -     Urine Culture - Urine, Urine, Clean Catch    5. Nexplanon in place       Counseling was given to patient for the following topics: instructions for management, risks and benefits of treatment options and importance of treatment compliance . Total time of the encounter was 20 minutes and 15 minutes was spent counseling.  Return in about 2 months (around 3/25/2022), or F/U bleeding with Nexplanon.

## 2022-01-27 LAB
A VAGINAE DNA VAG QL NAA+PROBE: NORMAL SCORE
BACTERIA UR CULT: NORMAL
BACTERIA UR CULT: NORMAL
BVAB2 DNA VAG QL NAA+PROBE: NORMAL SCORE
C ALBICANS DNA VAG QL NAA+PROBE: NEGATIVE
C GLABRATA DNA VAG QL NAA+PROBE: NEGATIVE
C TRACH DNA VAG QL NAA+PROBE: NEGATIVE
MEGA1 DNA VAG QL NAA+PROBE: NORMAL SCORE
N GONORRHOEA DNA VAG QL NAA+PROBE: NEGATIVE
T VAGINALIS DNA VAG QL NAA+PROBE: NEGATIVE

## 2022-03-29 ENCOUNTER — OFFICE VISIT (OUTPATIENT)
Dept: OBSTETRICS AND GYNECOLOGY | Age: 22
End: 2022-03-29

## 2022-03-29 VITALS
WEIGHT: 226 LBS | DIASTOLIC BLOOD PRESSURE: 70 MMHG | BODY MASS INDEX: 38.58 KG/M2 | HEIGHT: 64 IN | SYSTOLIC BLOOD PRESSURE: 110 MMHG

## 2022-03-29 DIAGNOSIS — E28.2 PCOS (POLYCYSTIC OVARIAN SYNDROME): ICD-10-CM

## 2022-03-29 DIAGNOSIS — Z97.5 NEXPLANON IN PLACE: ICD-10-CM

## 2022-03-29 DIAGNOSIS — N93.9 ABNORMAL UTERINE BLEEDING (AUB): Primary | ICD-10-CM

## 2022-03-29 DIAGNOSIS — N92.1 BREAKTHROUGH BLEEDING ON NEXPLANON: ICD-10-CM

## 2022-03-29 DIAGNOSIS — Z97.5 BREAKTHROUGH BLEEDING ON NEXPLANON: ICD-10-CM

## 2022-03-29 PROCEDURE — 99213 OFFICE O/P EST LOW 20 MIN: CPT | Performed by: OBSTETRICS & GYNECOLOGY

## 2022-03-29 RX ORDER — NORETHINDRONE ACETATE AND ETHINYL ESTRADIOL, ETHINYL ESTRADIOL AND FERROUS FUMARATE 1MG-10(24)
KIT ORAL DAILY
COMMUNITY
End: 2022-11-08

## 2022-03-29 RX ORDER — METFORMIN HYDROCHLORIDE 500 MG/1
1000 TABLET, EXTENDED RELEASE ORAL
Qty: 60 TABLET | Refills: 12 | Status: SHIPPED | OUTPATIENT
Start: 2022-03-29 | End: 2022-11-08

## 2022-03-29 NOTE — PROGRESS NOTES
"Subjective   Sita Amaya is a 21 y.o. female presents to f/u on BTB on nexplanon.  Has done well after adding the lo-loestrin. Would like more samples - was very emotional when on her period.  Reports she was previously on Metformin and would like to go back on it.     .     History of Present Illness    The following portions of the patient's history were reviewed and updated as appropriate: allergies, current medications, past family history, past medical history, past social history, past surgical history and problem list.    Review of Systems   Constitutional: Negative for chills, fatigue and fever.   Gastrointestinal: Negative for abdominal distention and abdominal pain.   Genitourinary: Negative for dysuria, menstrual problem, pelvic pain, vaginal bleeding, vaginal discharge and vaginal pain.   All other systems reviewed and are negative.  /70   Ht 162.6 cm (64\")   Wt 103 kg (226 lb)   LMP 03/18/2022 (Exact Date)   Breastfeeding No   BMI 38.79 kg/m²       Objective   Physical Exam  Vitals and nursing note reviewed.   Constitutional:       Appearance: Normal appearance. She is obese.   Pulmonary:      Effort: Pulmonary effort is normal.   Neurological:      Mental Status: She is alert and oriented to person, place, and time.   Psychiatric:         Mood and Affect: Mood normal.         Behavior: Behavior normal.         Assessment/Plan   Diagnoses and all orders for this visit:    1. Abnormal uterine bleeding (AUB) (Primary)    2. Nexplanon in place    3. Breakthrough bleeding on Nexplanon    4. PCOS (polycystic ovarian syndrome)  -     metFORMIN ER (Glucophage XR) 500 MG 24 hr tablet; Take 2 tablets by mouth Daily With Breakfast.  Dispense: 60 tablet; Refill: 12       Counseling was given to patient for the following topics: diagnostic results, instructions for management, risks and benefits of treatment options and importance of treatment compliance . Total time of the encounter was 20 minutes " and 15 minutes was spent counseling.    Return in about 7 months (around 10/29/2022) for Annual physical.

## 2022-06-23 ENCOUNTER — OFFICE VISIT (OUTPATIENT)
Dept: FAMILY MEDICINE CLINIC | Facility: CLINIC | Age: 22
End: 2022-06-23

## 2022-06-23 VITALS
BODY MASS INDEX: 41.48 KG/M2 | HEART RATE: 76 BPM | OXYGEN SATURATION: 100 % | WEIGHT: 243 LBS | TEMPERATURE: 97.8 F | SYSTOLIC BLOOD PRESSURE: 112 MMHG | HEIGHT: 64 IN | DIASTOLIC BLOOD PRESSURE: 78 MMHG

## 2022-06-23 DIAGNOSIS — T24.132A SUPERFICIAL BURN OF LEFT LOWER LEG, INITIAL ENCOUNTER: Primary | ICD-10-CM

## 2022-06-23 PROCEDURE — 99213 OFFICE O/P EST LOW 20 MIN: CPT | Performed by: NURSE PRACTITIONER

## 2022-06-23 RX ORDER — DOXYCYCLINE HYCLATE 100 MG/1
100 CAPSULE ORAL 2 TIMES DAILY
Qty: 20 CAPSULE | Refills: 0 | Status: SHIPPED | OUTPATIENT
Start: 2022-06-23 | End: 2022-10-19

## 2022-07-18 ENCOUNTER — TELEPHONE (OUTPATIENT)
Dept: FAMILY MEDICINE CLINIC | Facility: CLINIC | Age: 22
End: 2022-07-18

## 2022-08-05 ENCOUNTER — TELEPHONE (OUTPATIENT)
Dept: FAMILY MEDICINE CLINIC | Facility: CLINIC | Age: 22
End: 2022-08-05

## 2022-08-05 NOTE — TELEPHONE ENCOUNTER
Caller: Sita Amaya    Relationship: Self    Best call back number: [phone]    What does billing need from the patient: ON July 20 PATIENT WAS IN FOR APPT AND STATED THAT HER INSURANCE WAS NOT BILLED.  PATIENT HAS A BALANCE .00 AND WOULD LIKE TO GET IT SENT INTO HER INSURANCE.

## 2022-08-22 ENCOUNTER — OFFICE VISIT (OUTPATIENT)
Dept: FAMILY MEDICINE CLINIC | Facility: CLINIC | Age: 22
End: 2022-08-22

## 2022-08-22 VITALS
SYSTOLIC BLOOD PRESSURE: 118 MMHG | DIASTOLIC BLOOD PRESSURE: 80 MMHG | WEIGHT: 243 LBS | TEMPERATURE: 97.7 F | BODY MASS INDEX: 41.48 KG/M2 | HEART RATE: 73 BPM | OXYGEN SATURATION: 100 % | HEIGHT: 64 IN

## 2022-08-22 DIAGNOSIS — M25.561 ACUTE PAIN OF RIGHT KNEE: ICD-10-CM

## 2022-08-22 DIAGNOSIS — R10.11 RUQ PAIN: Primary | ICD-10-CM
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
## 2022-08-22 DIAGNOSIS — R19.4 INCREASED BOWEL FREQUENCY: ICD-10-CM

## 2022-08-22 PROBLEM — F84.0 AUTISM: Status: ACTIVE | Noted: 2022-08-22

## 2022-08-22 PROBLEM — K81.1 CHRONIC CHOLECYSTITIS: Status: ACTIVE | Noted: 2021-08-04

## 2022-08-22 PROBLEM — F41.9 ANXIETY: Status: ACTIVE | Noted: 2022-08-22

## 2022-08-22 PROBLEM — Z90.49 S/P LAPAROSCOPIC CHOLECYSTECTOMY: Status: ACTIVE | Noted: 2021-09-24

## 2022-08-22 LAB
B-HCG UR QL: NEGATIVE
EXPIRATION DATE: NORMAL
INTERNAL NEGATIVE CONTROL: NORMAL
INTERNAL POSITIVE CONTROL: NORMAL
Lab: NORMAL

## 2022-08-22 PROCEDURE — 81025 URINE PREGNANCY TEST: CPT | Performed by: PHYSICIAN ASSISTANT

## 2022-08-22 PROCEDURE — 99214 OFFICE O/P EST MOD 30 MIN: CPT | Performed by: PHYSICIAN ASSISTANT

## 2022-08-22 PROCEDURE — 73560 X-RAY EXAM OF KNEE 1 OR 2: CPT | Performed by: PHYSICIAN ASSISTANT

## 2022-08-22 RX ORDER — PROPRANOLOL HYDROCHLORIDE 20 MG/1
TABLET ORAL
COMMUNITY
Start: 2022-08-11 | End: 2022-11-08

## 2022-08-22 RX ORDER — LAMOTRIGINE 100 MG/1
TABLET ORAL
COMMUNITY
Start: 2022-08-11 | End: 2022-11-22

## 2022-08-26 LAB
ALBUMIN SERPL-MCNC: 4.4 G/DL (ref 3.9–5)
ALBUMIN/GLOB SERPL: 1.7 {RATIO} (ref 1.2–2.2)
ALP SERPL-CCNC: 75 IU/L (ref 44–121)
ALT SERPL-CCNC: 26 IU/L (ref 0–32)
AMYLASE SERPL-CCNC: 117 U/L (ref 31–110)
APPEARANCE UR: ABNORMAL
AST SERPL-CCNC: 19 IU/L (ref 0–40)
BACTERIA #/AREA URNS HPF: ABNORMAL /[HPF]
BACTERIA UR CULT: ABNORMAL
BACTERIA UR CULT: ABNORMAL
BASOPHILS # BLD AUTO: 0.1 X10E3/UL (ref 0–0.2)
BASOPHILS NFR BLD AUTO: 0 %
BILIRUB SERPL-MCNC: 0.3 MG/DL (ref 0–1.2)
BILIRUB UR QL STRIP: NEGATIVE
BUN SERPL-MCNC: 9 MG/DL (ref 6–20)
BUN/CREAT SERPL: 12 (ref 9–23)
CALCIUM SERPL-MCNC: 10 MG/DL (ref 8.7–10.2)
CASTS URNS QL MICRO: ABNORMAL /LPF
CHLORIDE SERPL-SCNC: 101 MMOL/L (ref 96–106)
CO2 SERPL-SCNC: 24 MMOL/L (ref 20–29)
COLOR UR: YELLOW
CREAT SERPL-MCNC: 0.75 MG/DL (ref 0.57–1)
EGFRCR-CYS SERPLBLD CKD-EPI 2021: 115 ML/MIN/1.73
EOSINOPHIL # BLD AUTO: 0.1 X10E3/UL (ref 0–0.4)
EOSINOPHIL NFR BLD AUTO: 0 %
EPI CELLS #/AREA URNS HPF: ABNORMAL /HPF (ref 0–10)
ERYTHROCYTE [DISTWIDTH] IN BLOOD BY AUTOMATED COUNT: 12.6 % (ref 11.7–15.4)
GLOBULIN SER CALC-MCNC: 2.6 G/DL (ref 1.5–4.5)
GLUCOSE SERPL-MCNC: 77 MG/DL (ref 65–99)
GLUCOSE UR QL STRIP: NEGATIVE
HCT VFR BLD AUTO: 40.1 % (ref 34–46.6)
HGB BLD-MCNC: 13.1 G/DL (ref 11.1–15.9)
HGB UR QL STRIP: ABNORMAL
IMM GRANULOCYTES # BLD AUTO: 0 X10E3/UL (ref 0–0.1)
IMM GRANULOCYTES NFR BLD AUTO: 0 %
KETONES UR QL STRIP: ABNORMAL
LEUKOCYTE ESTERASE UR QL STRIP: ABNORMAL
LIPASE SERPL-CCNC: 35 U/L (ref 14–72)
LYMPHOCYTES # BLD AUTO: 3.8 X10E3/UL (ref 0.7–3.1)
LYMPHOCYTES NFR BLD AUTO: 31 %
MCH RBC QN AUTO: 29.1 PG (ref 26.6–33)
MCHC RBC AUTO-ENTMCNC: 32.7 G/DL (ref 31.5–35.7)
MCV RBC AUTO: 89 FL (ref 79–97)
MICRO URNS: ABNORMAL
MONOCYTES # BLD AUTO: 0.5 X10E3/UL (ref 0.1–0.9)
MONOCYTES NFR BLD AUTO: 4 %
NEUTROPHILS # BLD AUTO: 7.7 X10E3/UL (ref 1.4–7)
NEUTROPHILS NFR BLD AUTO: 65 %
NITRITE UR QL STRIP: NEGATIVE
OTHER ANTIBIOTIC SUSC ISLT: ABNORMAL
PH UR STRIP: 5 [PH] (ref 5–7.5)
PLATELET # BLD AUTO: 383 X10E3/UL (ref 150–450)
POTASSIUM SERPL-SCNC: 3.8 MMOL/L (ref 3.5–5.2)
PROT SERPL-MCNC: 7 G/DL (ref 6–8.5)
PROT UR QL STRIP: ABNORMAL
RBC # BLD AUTO: 4.5 X10E6/UL (ref 3.77–5.28)
RBC #/AREA URNS HPF: ABNORMAL /HPF (ref 0–2)
SODIUM SERPL-SCNC: 143 MMOL/L (ref 134–144)
SP GR UR STRIP: 1.03 (ref 1–1.03)
T3FREE SERPL-MCNC: 3.4 PG/ML (ref 2–4.4)
T4 FREE SERPL-MCNC: 1.21 NG/DL (ref 0.82–1.77)
TSH SERPL DL<=0.005 MIU/L-ACNC: 1.2 UIU/ML (ref 0.45–4.5)
URINALYSIS REFLEX: ABNORMAL
UROBILINOGEN UR STRIP-MCNC: 0.2 MG/DL (ref 0.2–1)
WBC # BLD AUTO: 12.2 X10E3/UL (ref 3.4–10.8)
WBC #/AREA URNS HPF: >30 /HPF (ref 0–5)

## 2022-09-06 DIAGNOSIS — N39.0 URINARY TRACT INFECTION WITHOUT HEMATURIA, SITE UNSPECIFIED: Primary | ICD-10-CM

## 2022-09-06 RX ORDER — NITROFURANTOIN 25; 75 MG/1; MG/1
100 CAPSULE ORAL 2 TIMES DAILY
Qty: 14 CAPSULE | Refills: 0 | Status: SHIPPED | OUTPATIENT
Start: 2022-09-06 | End: 2022-10-19

## 2022-10-19 ENCOUNTER — OFFICE VISIT (OUTPATIENT)
Dept: OBSTETRICS AND GYNECOLOGY | Age: 22
End: 2022-10-19

## 2022-10-19 VITALS
DIASTOLIC BLOOD PRESSURE: 78 MMHG | HEIGHT: 64 IN | WEIGHT: 245 LBS | BODY MASS INDEX: 41.83 KG/M2 | SYSTOLIC BLOOD PRESSURE: 128 MMHG

## 2022-10-19 DIAGNOSIS — F84.0 AUTISM: ICD-10-CM

## 2022-10-19 DIAGNOSIS — N94.6 DYSMENORRHEA: ICD-10-CM

## 2022-10-19 DIAGNOSIS — Z97.5 NEXPLANON IN PLACE: Primary | ICD-10-CM

## 2022-10-19 DIAGNOSIS — R11.0 NAUSEA WITHOUT VOMITING: ICD-10-CM

## 2022-10-19 DIAGNOSIS — Z97.5 BREAKTHROUGH BLEEDING ON NEXPLANON: ICD-10-CM

## 2022-10-19 DIAGNOSIS — N92.1 BREAKTHROUGH BLEEDING ON NEXPLANON: ICD-10-CM

## 2022-10-19 PROBLEM — N93.9 ABNORMAL UTERINE BLEEDING (AUB): Status: RESOLVED | Noted: 2021-09-07 | Resolved: 2022-10-19

## 2022-10-19 PROBLEM — K81.1 CHRONIC CHOLECYSTITIS: Status: RESOLVED | Noted: 2021-08-04 | Resolved: 2022-10-19

## 2022-10-19 PROBLEM — Z90.49 S/P LAPAROSCOPIC CHOLECYSTECTOMY: Status: RESOLVED | Noted: 2021-09-24 | Resolved: 2022-10-19

## 2022-10-19 PROBLEM — L70.9 ACNE: Status: RESOLVED | Noted: 2017-02-28 | Resolved: 2022-10-19

## 2022-10-19 PROCEDURE — 99213 OFFICE O/P EST LOW 20 MIN: CPT | Performed by: OBSTETRICS & GYNECOLOGY

## 2022-10-19 NOTE — PROGRESS NOTES
[facility]   Obstetrics and Gynecology     10/19/2022      Patient:  Sita Amaya   MR#:[id number]    Office note    Chief Complaint   Patient presents with   • Follow-up     Nexplanon consult        Subjective     History of Present Illness  22 y.o. female  calls for an urgent visit for inability to locate her Nexplanon checking in her dominant arm and not her nondominant arm.  The patient's had the device for a little more than a year and most symptoms of abnormal uterine bleeding have resolved.  The patient's newly convinced that the Nexplanon is the source of nausea.    The patient is requesting the device be removed today.  She is advised she will need to follow-up and have that discussion with Dr. Ferrer that I will not remove the device today      Relevant data reviewed:      Patient Active Problem List   Diagnosis   • Hirsutism   • Dysmenorrhea   • Bicornate uterus   • Nexplanon in place   • Breakthrough bleeding on Nexplanon   • Anxiety   • Autism       Past Medical History:   Diagnosis Date   • Abnormal uterine bleeding (AUB) 2017   • ADHD (attention deficit hyperactivity disorder)    • Anxiety    • Asperger syndrome    • Depression      Past Surgical History:   Procedure Laterality Date   • CHOLECYSTECTOMY     • TONSILLECTOMY       Obstetric History:  OB History        0    Para   0    Term   0       0    AB   0    Living   0       SAB   0    IAB   0    Ectopic   0    Molar   0    Multiple   0    Live Births   0               Menstrual History:     Patient's last menstrual period was 2022 (within days).       The patient has never been pregnant.  Family History   Problem Relation Age of Onset   • Diabetes Maternal Grandmother    • Heart attack Maternal Grandfather    • Hyperlipidemia Maternal Grandfather    • Hypertension Maternal Grandfather    • Diabetes Maternal Grandfather    • Heart attack Paternal Grandfather    • Hypertension Paternal Grandfather     • Hyperlipidemia Paternal Grandfather    • Breast cancer Neg Hx    • Ovarian cancer Neg Hx    • Uterine cancer Neg Hx    • Colon cancer Neg Hx    • Deep vein thrombosis Neg Hx    • Pulmonary embolism Neg Hx    • Osteoporosis Neg Hx      Social History     Tobacco Use   • Smoking status: Never   • Smokeless tobacco: Never   Vaping Use   • Vaping Use: Never used   Substance Use Topics   • Alcohol use: No   • Drug use: No     Penicillins    Current Outpatient Medications:   •  atomoxetine (STRATTERA) 25 MG capsule, Take 25 mg by mouth Daily., Disp: , Rfl:   •  Diclofenac Sodium (Voltaren) 1 % gel gel, Apply 4 g topically to the appropriate area as directed 3 (Three) Times a Day As Needed (knee pain)., Disp: 100 g, Rfl: 0  •  Etonogestrel (Nexplanon) 68 MG implant subdermal implant, Inject 1 each into the appropriate area of the skin as directed by provider 1 (One) Time., Disp: , Rfl:   •  FLUoxetine (PROzac) 40 MG capsule, Take  by mouth Daily., Disp: , Rfl:   •  lamoTRIgine (LaMICtal) 100 MG tablet, , Disp: , Rfl:   •  metFORMIN ER (Glucophage XR) 500 MG 24 hr tablet, Take 2 tablets by mouth Daily With Breakfast., Disp: 60 tablet, Rfl: 12  •  Norethin-Eth Estrad-Fe Biphas (Lo Loestrin Fe) 1 MG-10 MCG / 10 MCG tablet, Take  by mouth Daily., Disp: , Rfl:   •  propranolol (INDERAL) 20 MG tablet, , Disp: , Rfl:     The following portions of the patient's history were reviewed and updated as appropriate: allergies, current medications, past family history, past medical history, past social history, past surgical history and problem list.    Review of Systems   Constitutional: Negative.    Respiratory: Negative.    Cardiovascular: Negative.    Gastrointestinal: Positive for nausea.   Genitourinary: Positive for vaginal bleeding.   Psychiatric/Behavioral: Negative.        BP Readings from Last 3 Encounters:   10/19/22 128/78   08/22/22 118/80   06/23/22 112/78      Wt Readings from Last 3 Encounters:   10/19/22 111 kg (245  "lb)   08/22/22 110 kg (243 lb)   06/23/22 110 kg (243 lb)      BMI: Estimated body mass index is 42.05 kg/m² as calculated from the following:    Height as of this encounter: 162.6 cm (64\").    Weight as of this encounter: 111 kg (245 lb). BSA: Estimated body surface area is 2.13 meters squared as calculated from the following:    Height as of this encounter: 162.6 cm (64\").    Weight as of this encounter: 111 kg (245 lb).    Objective   Physical Exam  Vitals and nursing note reviewed.   Constitutional:       Appearance: She is well-developed.   HENT:      Head: Normocephalic and atraumatic.   Cardiovascular:      Rate and Rhythm: Normal rate.   Pulmonary:      Effort: Pulmonary effort is normal.   Abdominal:      General: Bowel sounds are normal. There is no distension.      Palpations: Abdomen is soft.      Tenderness: There is no abdominal tenderness.   Skin:     General: Skin is warm and dry.   Neurological:      Mental Status: She is alert and oriented to person, place, and time.   Psychiatric:         Behavior: Behavior normal.         Thought Content: Thought content normal.         Judgment: Judgment normal.         Assessment & Plan     Diagnoses and all orders for this visit:    1. Nexplanon in place (Primary)    2. Nausea without vomiting    3. Autism    4. Breakthrough bleeding on Nexplanon    5. Dysmenorrhea       Periodic light menses twice a month not uncommon with Nexplanon.  Think the likelihood that nausea is related to the Nexplanon is exceptionally low.  Discussed nausea associated with metformin.  Recommend the device does not be removed.    Suggest follow-up with her establish physician Dr. Ferrer for further consult and management      No follow-ups on file.    Pollo Gonzalez MD   10/19/2022 14:46 EDT  "

## 2022-10-28 ENCOUNTER — OFFICE VISIT (OUTPATIENT)
Dept: OBSTETRICS AND GYNECOLOGY | Age: 22
End: 2022-10-28

## 2022-10-28 VITALS
WEIGHT: 247 LBS | BODY MASS INDEX: 42.17 KG/M2 | HEIGHT: 64 IN | SYSTOLIC BLOOD PRESSURE: 122 MMHG | DIASTOLIC BLOOD PRESSURE: 76 MMHG

## 2022-10-28 DIAGNOSIS — Z30.46 NEXPLANON REMOVAL: Primary | ICD-10-CM

## 2022-10-28 PROCEDURE — 11982 REMOVE DRUG IMPLANT DEVICE: CPT | Performed by: NURSE PRACTITIONER

## 2022-10-28 NOTE — PROGRESS NOTES
Nexplanon Removal    Date of procedure:  10/28/2022   Here for nexplanon removal.  States it causes nausea for her.  She does not desire birth control and would be fine if she got pregnant.      Risks and benefits discussed? yes  All questions answered? yes  Consents given by the patient  Written consent obtained? yes    Local anesthesia used:  yes - 3 cc's of  Meds; anesthesia local: None, 1% lidocaine    Procedure documentation:    The upper left arm (non-dominant) was marked at the intended site of removal.  Betadine was used to cleanse the skin.  Local anesthesia was injected.  A vertical incision was created at the distal tip of the implant.  The implant was removed intact without difficulty.  Steri-strips were then placed across the site of insertion and the arm was wrapped.    She tolerated the procedure well.  There were no complications.  EBL was minimal.    Post procedure instructions: Remove the wrapping in 24 hours and the steri-strips in 5 days.    Follow up needed: PRN.  Discussed PNV and medications that would be contraindicated with pregnancy.  Schedule AE and preconception visit with Dr Ferrre

## 2022-11-08 ENCOUNTER — OFFICE VISIT (OUTPATIENT)
Dept: OBSTETRICS AND GYNECOLOGY | Age: 22
End: 2022-11-08

## 2022-11-08 VITALS
DIASTOLIC BLOOD PRESSURE: 66 MMHG | BODY MASS INDEX: 42.17 KG/M2 | HEIGHT: 64 IN | WEIGHT: 247 LBS | SYSTOLIC BLOOD PRESSURE: 118 MMHG

## 2022-11-08 DIAGNOSIS — Z11.3 SCREEN FOR STD (SEXUALLY TRANSMITTED DISEASE): ICD-10-CM

## 2022-11-08 DIAGNOSIS — Z01.419 ENCOUNTER FOR GYNECOLOGICAL EXAMINATION WITHOUT ABNORMAL FINDING: Primary | ICD-10-CM

## 2022-11-08 DIAGNOSIS — Z12.4 SCREENING FOR MALIGNANT NEOPLASM OF THE CERVIX: ICD-10-CM

## 2022-11-08 DIAGNOSIS — Z13.9 SPECIAL SCREENING: ICD-10-CM

## 2022-11-08 PROBLEM — Z97.5 NEXPLANON IN PLACE: Status: RESOLVED | Noted: 2021-11-02 | Resolved: 2022-11-08

## 2022-11-08 PROBLEM — Z97.5 BREAKTHROUGH BLEEDING ON NEXPLANON: Status: RESOLVED | Noted: 2022-01-25 | Resolved: 2022-11-08

## 2022-11-08 PROBLEM — N92.1 BREAKTHROUGH BLEEDING ON NEXPLANON: Status: RESOLVED | Noted: 2022-01-25 | Resolved: 2022-11-08

## 2022-11-08 LAB
B-HCG UR QL: NEGATIVE
EXPIRATION DATE: NORMAL
INTERNAL NEGATIVE CONTROL: NEGATIVE
INTERNAL POSITIVE CONTROL: POSITIVE
Lab: NORMAL

## 2022-11-08 PROCEDURE — 2014F MENTAL STATUS ASSESS: CPT | Performed by: OBSTETRICS & GYNECOLOGY

## 2022-11-08 PROCEDURE — 81025 URINE PREGNANCY TEST: CPT | Performed by: OBSTETRICS & GYNECOLOGY

## 2022-11-08 PROCEDURE — 99395 PREV VISIT EST AGE 18-39: CPT | Performed by: OBSTETRICS & GYNECOLOGY

## 2022-11-08 NOTE — PROGRESS NOTES
"Angela Amaya is a 22 y.o. female presents for annual visit today - last pap and vag swab neg 6/30/21 - contraception none - normal periods - trying to get pregnant.  Advised PNV and can restart Metformin.  Pregnancy test today is negative.         History of Present Illness    The following portions of the patient's history were reviewed and updated as appropriate: allergies, current medications, past family history, past medical history, past social history, past surgical history and problem list.    Review of Systems   Constitutional: Negative for chills, fatigue and fever.   Gastrointestinal: Negative for abdominal distention and abdominal pain.   Genitourinary: Negative for dyspareunia, dysuria, menstrual problem, pelvic pain, vaginal bleeding, vaginal discharge and vaginal pain.   All other systems reviewed and are negative.    /66   Ht 162.6 cm (64\")   Wt 112 kg (247 lb)   LMP 10/31/2022 (Exact Date)   BMI 42.40 kg/m²       Objective   Physical Exam  Vitals and nursing note reviewed.   Constitutional:       Appearance: Normal appearance. She is well-developed. She is obese.   Neck:      Thyroid: No thyromegaly.   Pulmonary:      Effort: Pulmonary effort is normal.   Chest:   Breasts:     Right: No mass, nipple discharge, skin change or tenderness.      Left: No mass, nipple discharge, skin change or tenderness.   Abdominal:      General: There is no distension.      Palpations: Abdomen is soft.      Tenderness: There is no abdominal tenderness.   Genitourinary:     General: Normal vulva.      Exam position: Lithotomy position.      Labia:         Right: No rash or lesion.         Left: No rash or lesion.       Vagina: Normal. No vaginal discharge or bleeding.      Cervix: No friability, lesion or cervical bleeding.      Uterus: Not enlarged and not tender.       Adnexa:         Right: No mass or tenderness.          Left: No mass or tenderness.     Musculoskeletal:         General: " Normal range of motion.      Cervical back: Normal range of motion.   Skin:     General: Skin is warm and dry.      Findings: No rash.   Neurological:      Mental Status: She is alert and oriented to person, place, and time.   Psychiatric:         Mood and Affect: Mood normal.         Behavior: Behavior normal.           Assessment & Plan   Diagnoses and all orders for this visit:    1. Encounter for gynecological examination without abnormal finding (Primary)    2. Screening for malignant neoplasm of the cervix  -     IGP, Rfx Aptima HPV ASCU    3. Special screening  -     POC Pregnancy, Urine    4. Screen for STD (sexually transmitted disease)  -     NuSwab VG+ - Swab, Vagina      Counseling was given to patient for the following topics: instructions for management and impressions and self-breast exams.   Return in about 1 year (around 11/8/2023) for Annual physical.

## 2022-11-11 LAB
A VAGINAE DNA VAG QL NAA+PROBE: NORMAL SCORE
BVAB2 DNA VAG QL NAA+PROBE: NORMAL SCORE
C ALBICANS DNA VAG QL NAA+PROBE: NEGATIVE
C GLABRATA DNA VAG QL NAA+PROBE: NEGATIVE
C TRACH DNA VAG QL NAA+PROBE: NEGATIVE
MEGA1 DNA VAG QL NAA+PROBE: NORMAL SCORE
N GONORRHOEA DNA VAG QL NAA+PROBE: NEGATIVE
T VAGINALIS DNA VAG QL NAA+PROBE: NEGATIVE

## 2022-11-13 LAB
CONV .: NORMAL
CYTOLOGIST CVX/VAG CYTO: NORMAL
CYTOLOGY CVX/VAG DOC CYTO: NORMAL
CYTOLOGY CVX/VAG DOC THIN PREP: NORMAL
DX ICD CODE: NORMAL
HIV 1 & 2 AB SER-IMP: NORMAL
OTHER STN SPEC: NORMAL
STAT OF ADQ CVX/VAG CYTO-IMP: NORMAL

## 2022-11-17 ENCOUNTER — TELEPHONE (OUTPATIENT)
Dept: OBSTETRICS AND GYNECOLOGY | Age: 22
End: 2022-11-17

## 2022-11-17 NOTE — TELEPHONE ENCOUNTER
LMP 10/30/22 , positive preg test today, pt states she was told to call when gets positive preg test to call to have labs drawn.Please advise when and what labs

## 2022-11-22 ENCOUNTER — OFFICE VISIT (OUTPATIENT)
Dept: FAMILY MEDICINE CLINIC | Facility: CLINIC | Age: 22
End: 2022-11-22

## 2022-11-22 VITALS
OXYGEN SATURATION: 99 % | HEIGHT: 64 IN | SYSTOLIC BLOOD PRESSURE: 128 MMHG | HEART RATE: 80 BPM | BODY MASS INDEX: 42.68 KG/M2 | WEIGHT: 250 LBS | DIASTOLIC BLOOD PRESSURE: 80 MMHG | TEMPERATURE: 98 F | RESPIRATION RATE: 16 BRPM

## 2022-11-22 DIAGNOSIS — N92.6 MISSED PERIOD: Primary | ICD-10-CM

## 2022-11-22 PROCEDURE — 81025 URINE PREGNANCY TEST: CPT | Performed by: PHYSICIAN ASSISTANT

## 2022-11-22 PROCEDURE — 99213 OFFICE O/P EST LOW 20 MIN: CPT | Performed by: PHYSICIAN ASSISTANT

## 2022-11-23 ENCOUNTER — OFFICE VISIT (OUTPATIENT)
Dept: OBSTETRICS AND GYNECOLOGY | Age: 22
End: 2022-11-23

## 2022-11-23 VITALS
BODY MASS INDEX: 42.68 KG/M2 | HEIGHT: 64 IN | SYSTOLIC BLOOD PRESSURE: 130 MMHG | DIASTOLIC BLOOD PRESSURE: 74 MMHG | WEIGHT: 250 LBS

## 2022-11-23 DIAGNOSIS — N92.6 MISSED MENSES: Primary | ICD-10-CM

## 2022-11-23 PROCEDURE — 81025 URINE PREGNANCY TEST: CPT | Performed by: NURSE PRACTITIONER

## 2022-11-23 PROCEDURE — 99213 OFFICE O/P EST LOW 20 MIN: CPT | Performed by: NURSE PRACTITIONER

## 2022-11-23 RX ORDER — PRENATAL VIT NO.126/IRON/FOLIC 28MG-0.8MG
TABLET ORAL DAILY
COMMUNITY
End: 2023-02-04

## 2022-11-23 NOTE — PROGRESS NOTES
"Subjective     Chief Complaint   Patient presents with   • Menstrual Problem     Amennorhea, 2 hpt positive, 2022 went to [facility] , did bloodwork was neg       Sita Amaya is a 22 y.o.  whose LMP is Patient's last menstrual period was 10/03/2022 (exact date).     Pt presents today with chief complaint of amenorrhea  She states she had 2 + UPT at home  Went to [facility] 3 days ago and states blood work was negative  Just had nexplanon removed in  she was having regular cycles on nexplanon  Had one day of bleeding after after it removed  She was due to start menses  but never started  Took a home pregnancy test x 2 that were faint positive on the    Has PCOS, obesity  Desires pregnancy   Pt of Dr. Ferrer     No Additional Complaints Reported    The following portions of the patient's history were reviewed and updated as appropriate:vital signs, allergies, current medications, past medical history, past social history, past surgical history and problem list      Review of Systems   Pertinent items are noted in HPI.     Objective      /74   Ht 162.6 cm (64\")   Wt 113 kg (250 lb)   LMP 10/03/2022 (Exact Date)   BMI 42.91 kg/m²     Physical Exam    General:   alert, no distress and moderately obese   Heart: Not performed today   Lungs: Not performed today.   Breast: Not performed today   Neck: na   Abdomen: {Not performed today   CVA: Not performed today   Pelvis: Not performed today   Extremities: Not performed today   Neurologic: negative   Psychiatric: Normal affect, judgement, and mood       Lab Review   Labs: Urine pregnancy test     Imaging   No data reviewed    Assessment & Plan     ASSESSMENT  1. Missed menses        PLAN  1.   Orders Placed This Encounter   Procedures   • TSH Rfx On Abnormal To Free T4   • Prolactin   • POC Pregnancy, Urine       2. Medications prescribed this encounter:      No orders of the defined types were placed in this encounter.      3. " Check labs. UPT negative. Discussed possible chemical pregnancy. If no menses in the next month to follow up. Discussed need to see what her cycles do now that she is off birth control. Discussed PCOS and possible anovulatory cycles. Will plan a follow up with Dr. Ferrer in 4 months to further discuss if no pregnancy.    Jumana Hogan, APRN  11/23/2022

## 2022-11-24 LAB
PROLACTIN SERPL-MCNC: 25.9 NG/ML (ref 4.8–23.3)
TSH SERPL DL<=0.005 MIU/L-ACNC: 2.61 UIU/ML (ref 0.27–4.2)

## 2022-11-29 ENCOUNTER — TELEPHONE (OUTPATIENT)
Dept: OBSTETRICS AND GYNECOLOGY | Age: 22
End: 2022-11-29

## 2022-11-29 DIAGNOSIS — R79.89 ELEVATED PROLACTIN LEVEL: Primary | ICD-10-CM

## 2022-11-29 NOTE — TELEPHONE ENCOUNTER
Pt called stating she does not understand what the prolactin lab is actually for. She would like a phone call explaining what the prolactin level is and why it is being tested

## 2023-03-20 ENCOUNTER — TELEPHONE (OUTPATIENT)
Dept: OBSTETRICS AND GYNECOLOGY | Age: 23
End: 2023-03-20

## 2023-04-25 ENCOUNTER — OFFICE VISIT (OUTPATIENT)
Dept: OBSTETRICS AND GYNECOLOGY | Age: 23
End: 2023-04-25
Payer: MEDICAID

## 2023-04-25 VITALS
BODY MASS INDEX: 44.39 KG/M2 | WEIGHT: 260 LBS | SYSTOLIC BLOOD PRESSURE: 142 MMHG | HEIGHT: 64 IN | DIASTOLIC BLOOD PRESSURE: 80 MMHG

## 2023-04-25 DIAGNOSIS — Z13.9 SPECIAL SCREENING: ICD-10-CM

## 2023-04-25 DIAGNOSIS — N92.6 IRREGULAR MENSES: Primary | ICD-10-CM

## 2023-04-25 DIAGNOSIS — E28.2 PCOS (POLYCYSTIC OVARIAN SYNDROME): ICD-10-CM

## 2023-04-25 DIAGNOSIS — Z13.89 SCREENING FOR BLOOD OR PROTEIN IN URINE: ICD-10-CM

## 2023-04-25 LAB
B-HCG UR QL: NEGATIVE
BILIRUB BLD-MCNC: NEGATIVE MG/DL
EXPIRATION DATE: NORMAL
GLUCOSE UR STRIP-MCNC: NEGATIVE MG/DL
INTERNAL NEGATIVE CONTROL: NEGATIVE
INTERNAL POSITIVE CONTROL: NORMAL
KETONES UR QL: NEGATIVE
LEUKOCYTE EST, POC: NEGATIVE
Lab: NORMAL
NITRITE UR-MCNC: NEGATIVE MG/ML
PH UR: 7.5 [PH] (ref 5–8)
PROT UR STRIP-MCNC: ABNORMAL MG/DL
RBC # UR STRIP: NEGATIVE /UL
SP GR UR: 1.01 (ref 1–1.03)
UROBILINOGEN UR QL: ABNORMAL

## 2023-04-25 RX ORDER — METFORMIN HYDROCHLORIDE 500 MG/1
1000 TABLET, EXTENDED RELEASE ORAL
Qty: 60 TABLET | Refills: 12 | Status: SHIPPED | OUTPATIENT
Start: 2023-04-25

## 2023-04-25 NOTE — PROGRESS NOTES
"Angela Amaya is a 22 y.o. female presents to discuss fertility desires pregnancy - contraception none , menses are irregular , last period was in march 2023 , last pap 11/8/22.  Not taking the Metformin I prescribed for her to regulate periods. Will call in a new Rx and have her track periods next 3 months.      .     History of Present Illness    The following portions of the patient's history were reviewed and updated as appropriate: allergies, current medications, past family history, past medical history, past social history, past surgical history and problem list.    Review of Systems   Constitutional: Negative for chills, fatigue and fever.   Gastrointestinal: Negative for abdominal pain.   Genitourinary: Positive for menstrual problem. Negative for dyspareunia, dysuria, vaginal bleeding, vaginal discharge and vaginal pain.   All other systems reviewed and are negative.    /80   Ht 162.6 cm (64\")   Wt 118 kg (260 lb)   LMP 03/06/2023 (Approximate)   BMI 44.63 kg/m²     Objective   Physical Exam  Vitals and nursing note reviewed.   Constitutional:       Appearance: Normal appearance. She is obese.   Pulmonary:      Effort: Pulmonary effort is normal.   Neurological:      Mental Status: She is alert and oriented to person, place, and time.   Psychiatric:         Mood and Affect: Mood normal.         Behavior: Behavior normal.         Assessment & Plan   Diagnoses and all orders for this visit:    1. Irregular menses (Primary)  -     metFORMIN ER (GLUCOPHAGE-XR) 500 MG 24 hr tablet; Take 2 tablets by mouth Daily With Breakfast.  Dispense: 60 tablet; Refill: 12    2. Screening for blood or protein in urine  -     POC Urinalysis Dipstick    3. Special screening  -     POC Pregnancy, Urine    4. PCOS (polycystic ovarian syndrome)  -     metFORMIN ER (GLUCOPHAGE-XR) 500 MG 24 hr tablet; Take 2 tablets by mouth Daily With Breakfast.  Dispense: 60 tablet; Refill: 12       Counseling was given " to patient for the following topics: instructions for management, impressions, risks and benefits of treatment options and importance of treatment compliance . Total time of the encounter was 20 minutes and 15 minutes was spent counseling.  Return in about 3 months (around 7/25/2023), or F/U PCOS.

## 2023-11-21 ENCOUNTER — OFFICE VISIT (OUTPATIENT)
Dept: OBSTETRICS AND GYNECOLOGY | Age: 23
End: 2023-11-21
Payer: MEDICAID

## 2023-11-21 VITALS
SYSTOLIC BLOOD PRESSURE: 130 MMHG | DIASTOLIC BLOOD PRESSURE: 80 MMHG | HEIGHT: 64 IN | WEIGHT: 250 LBS | BODY MASS INDEX: 42.68 KG/M2

## 2023-11-21 DIAGNOSIS — Z13.89 SCREENING FOR BLOOD OR PROTEIN IN URINE: ICD-10-CM

## 2023-11-21 DIAGNOSIS — Z01.419 ENCOUNTER FOR GYNECOLOGICAL EXAMINATION WITHOUT ABNORMAL FINDING: Primary | ICD-10-CM

## 2023-11-21 DIAGNOSIS — E28.2 PCOS (POLYCYSTIC OVARIAN SYNDROME): ICD-10-CM

## 2023-11-21 DIAGNOSIS — Z13.9 SPECIAL SCREENING: ICD-10-CM

## 2023-11-21 DIAGNOSIS — Z11.3 SCREEN FOR STD (SEXUALLY TRANSMITTED DISEASE): ICD-10-CM

## 2023-11-21 DIAGNOSIS — Z12.4 SCREENING FOR MALIGNANT NEOPLASM OF THE CERVIX: ICD-10-CM

## 2023-11-21 DIAGNOSIS — N92.6 IRREGULAR MENSES: ICD-10-CM

## 2023-11-21 LAB
B-HCG UR QL: NEGATIVE
BILIRUB BLD-MCNC: NEGATIVE MG/DL
EXPIRATION DATE: NORMAL
GLUCOSE UR STRIP-MCNC: NEGATIVE MG/DL
INTERNAL NEGATIVE CONTROL: NEGATIVE
INTERNAL POSITIVE CONTROL: NORMAL
KETONES UR QL: NEGATIVE
LEUKOCYTE EST, POC: NEGATIVE
Lab: NORMAL
NITRITE UR-MCNC: NEGATIVE MG/ML
PH UR: 5.5 [PH] (ref 5–8)
PROT UR STRIP-MCNC: NEGATIVE MG/DL
RBC # UR STRIP: NEGATIVE /UL
SP GR UR: 1.03 (ref 1–1.03)
UROBILINOGEN UR QL: NORMAL

## 2023-11-21 RX ORDER — METFORMIN HYDROCHLORIDE 500 MG/1
1000 TABLET, EXTENDED RELEASE ORAL
Qty: 60 TABLET | Refills: 12 | Status: SHIPPED | OUTPATIENT
Start: 2023-11-21

## 2023-11-21 NOTE — PROGRESS NOTES
"Subjective   Sita Amaya is a 23 y.o. female presents annual visit today , last pap 11/8/22 neg , contraception none , patient was seeing @ [facility] in October but did not like the physician so she is back here, says she got pregnant in April found out in June and miscarriage in July his yr . Reports not having any procedures or taking any meds after SAB . Says periods are a little better more regular , she was advised to go to fertility specialist but she can not afford that right now , she desires pregnancy . Unable to urinate LMP 10/12 /23.  Pregnancy test today is negative.  Patient reports she had a D&C at [facility] for a miscarriage.  Reports she was taking metformin when she got pregnant.  Reports she stopped the metformin because some other doctor told her it was not necessary.  Discussed with patient that metformin can regulate her periods, help her with weight loss.    History of Present Illness    The following portions of the patient's history were reviewed and updated as appropriate: allergies, current medications, past family history, past medical history, past social history, past surgical history, and problem list.    Review of Systems   Constitutional:  Negative for chills, fatigue and fever.   Gastrointestinal:  Negative for abdominal distention and abdominal pain.   Genitourinary:  Negative for dyspareunia, dysuria, menstrual problem, pelvic pain, vaginal bleeding, vaginal discharge and vaginal pain.   All other systems reviewed and are negative.    /80   Ht 162.6 cm (64\")   Wt 113 kg (250 lb)   LMP 10/12/2023 (Approximate) Comment: 1 day of spotting in october  BMI 42.91 kg/m²     Objective   Physical Exam  Vitals and nursing note reviewed.   Constitutional:       Appearance: Normal appearance. She is well-developed. She is obese.   Neck:      Thyroid: No thyromegaly.   Pulmonary:      Effort: Pulmonary effort is normal.   Chest:   Breasts:     Right: No mass, nipple discharge, " skin change or tenderness.      Left: No mass, nipple discharge, skin change or tenderness.   Abdominal:      General: There is no distension.      Palpations: Abdomen is soft.      Tenderness: There is no abdominal tenderness.   Genitourinary:     General: Normal vulva.      Exam position: Lithotomy position.      Labia:         Right: No rash or lesion.         Left: No rash or lesion.       Vagina: Normal. No vaginal discharge or bleeding.      Cervix: No friability, lesion or cervical bleeding.      Uterus: Not enlarged and not tender.       Adnexa:         Right: No mass or tenderness.          Left: No mass or tenderness.     Musculoskeletal:         General: Normal range of motion.      Cervical back: Normal range of motion.   Skin:     General: Skin is warm and dry.      Findings: No rash.   Neurological:      Mental Status: She is alert and oriented to person, place, and time.   Psychiatric:         Mood and Affect: Mood normal.         Behavior: Behavior normal.           Assessment & Plan   Diagnoses and all orders for this visit:    1. Encounter for gynecological examination without abnormal finding (Primary)    2. Screening for malignant neoplasm of the cervix  -     IGP, Rfx Aptima HPV ASCU    3. Screening for blood or protein in urine  -     POC Urinalysis Dipstick    4. Special screening  -     POC Pregnancy, Urine    5. Screen for STD (sexually transmitted disease)  -     NuSwab VG+ - Swab, Vagina    6. PCOS (polycystic ovarian syndrome)  -     metFORMIN ER (GLUCOPHAGE-XR) 500 MG 24 hr tablet; Take 2 tablets by mouth Daily With Breakfast.  Dispense: 60 tablet; Refill: 12    7. Irregular menses  -     metFORMIN ER (GLUCOPHAGE-XR) 500 MG 24 hr tablet; Take 2 tablets by mouth Daily With Breakfast.  Dispense: 60 tablet; Refill: 12      Counseling was given to patient for the following topics: instructions for management, importance of treatment compliance, and self-breast exams    .   Return in about 1  year (around 11/21/2024) for Annual physical.

## 2024-01-15 ENCOUNTER — TELEPHONE (OUTPATIENT)
Dept: OBSTETRICS AND GYNECOLOGY | Age: 24
End: 2024-01-15
Payer: MEDICAID

## 2024-01-15 RX ORDER — PROGESTERONE 200 MG/1
200 CAPSULE ORAL DAILY
Qty: 30 CAPSULE | Refills: 1 | Status: SHIPPED | OUTPATIENT
Start: 2024-01-15

## 2024-01-15 RX ORDER — ASPIRIN 81 MG/1
81 TABLET ORAL DAILY
Qty: 90 TABLET | Refills: 2 | Status: SHIPPED | OUTPATIENT
Start: 2024-01-15

## 2024-01-15 NOTE — TELEPHONE ENCOUNTER
Yes, please add ultrasound to 1/22 appointment for confirmation.  Progesterone and baby aspirin sent to pharmacy..

## 2024-01-15 NOTE — TELEPHONE ENCOUNTER
----- Message from Sita Amaya sent at 1/12/2024 10:41 PM EST -----  Regarding: Pregnancy   Contact: [phone]  So for the past three weeks I have felt really bad never started my period so I’m almost three weeks late the last day I started my period was December 6. Just took a test got a positive when I come in on the 23 can we do blood work and an ultrasound

## 2024-01-16 ENCOUNTER — TELEPHONE (OUTPATIENT)
Dept: OBSTETRICS AND GYNECOLOGY | Age: 24
End: 2024-01-16
Payer: MEDICAID

## 2024-01-16 NOTE — TELEPHONE ENCOUNTER
Patient states she was late on her menses and took a pregnancy test and got a positive result. She is now stating she has begun bleeding bright red.  She is scheduled with Dr. Ferrer next Monday, January 22, 2024 for confirmation of pregnancy.  Patient also states she doesn't feel like the Metformin is working; she feels like her body is immune to it. Please advise.

## 2024-01-18 NOTE — TELEPHONE ENCOUNTER
Patient states she is now spotting light pink. Patient is scheduled for Monday and desires to keep this appointment.  Patient has been instructed if she was to start bleeding heavy where she is changing her tampon/sanitation napkin every 1-2 hours to go to the E.R. for evaluation.  Patient voices understanding.

## 2024-01-22 ENCOUNTER — OFFICE VISIT (OUTPATIENT)
Dept: OBSTETRICS AND GYNECOLOGY | Age: 24
End: 2024-01-22
Payer: MEDICAID

## 2024-01-22 VITALS
BODY MASS INDEX: 42.68 KG/M2 | DIASTOLIC BLOOD PRESSURE: 80 MMHG | WEIGHT: 250 LBS | SYSTOLIC BLOOD PRESSURE: 124 MMHG | HEIGHT: 64 IN

## 2024-01-22 DIAGNOSIS — Z32.01 POSITIVE URINE PREGNANCY TEST: ICD-10-CM

## 2024-01-22 DIAGNOSIS — Z13.9 SPECIAL SCREENING: Primary | ICD-10-CM

## 2024-01-22 PROBLEM — N92.6 IRREGULAR MENSES: Status: RESOLVED | Noted: 2023-04-25 | Resolved: 2024-01-22

## 2024-01-22 PROBLEM — N94.6 DYSMENORRHEA: Status: RESOLVED | Noted: 2017-02-28 | Resolved: 2024-01-22

## 2024-01-22 LAB
B-HCG UR QL: POSITIVE
BILIRUB BLD-MCNC: NEGATIVE MG/DL
EXPIRATION DATE: ABNORMAL
GLUCOSE UR STRIP-MCNC: NEGATIVE MG/DL
INTERNAL NEGATIVE CONTROL: NEGATIVE
INTERNAL POSITIVE CONTROL: ABNORMAL
KETONES UR QL: ABNORMAL
LEUKOCYTE EST, POC: NEGATIVE
Lab: ABNORMAL
NITRITE UR-MCNC: NEGATIVE MG/ML
PH UR: 5.5 [PH] (ref 5–8)
PROT UR STRIP-MCNC: ABNORMAL MG/DL
RBC # UR STRIP: ABNORMAL /UL
SP GR UR: 1.03 (ref 1–1.03)
UROBILINOGEN UR QL: NORMAL

## 2024-01-22 PROCEDURE — 81025 URINE PREGNANCY TEST: CPT | Performed by: OBSTETRICS & GYNECOLOGY

## 2024-01-22 PROCEDURE — 99214 OFFICE O/P EST MOD 30 MIN: CPT | Performed by: OBSTETRICS & GYNECOLOGY

## 2024-01-22 PROCEDURE — 1160F RVW MEDS BY RX/DR IN RCRD: CPT | Performed by: OBSTETRICS & GYNECOLOGY

## 2024-01-22 PROCEDURE — 1159F MED LIST DOCD IN RCRD: CPT | Performed by: OBSTETRICS & GYNECOLOGY

## 2024-01-22 NOTE — PROGRESS NOTES
"Angela Amaya is a 23 y.o. female presents for US and  pregnancy confirmation LMP + urine hcg at home on 1/12/24 , noticed some pinkish discharge last week on Tuesday.  Reports she fell last Thursday at home and after that she reports having some bleeding with right side abdominal pain.  Did restart metformin yesterday 250 mg - 500 was making her sick GI issues and vomiting.   Ultrasound today is essentially normal with a possible decidual reaction.  Discussed with patient that we will need to check a quant today and in 48 hours to determine if this is a normally progressing pregnancy.  Advised patient to report to the ER for significant vaginal bleeding or lower quadrant pain.  Advised patient to continue aspirin, prenatal vitamin, and progesterone.    History of Present Illness    The following portions of the patient's history were reviewed and updated as appropriate: allergies, current medications, past family history, past medical history, past social history, past surgical history, and problem list.    Review of Systems   Constitutional:  Negative for chills, fatigue and fever.   Gastrointestinal:  Positive for nausea. Negative for abdominal pain.   Genitourinary:  Negative for pelvic pain, vaginal bleeding, vaginal discharge and vaginal pain.   All other systems reviewed and are negative.  /80   Ht 162.6 cm (64\")   Wt 113 kg (250 lb)   LMP 12/07/2023 (Exact Date)   Breastfeeding No   BMI 42.91 kg/m²       Objective   Physical Exam  Vitals and nursing note reviewed.   Constitutional:       Appearance: Normal appearance. She is obese.   Pulmonary:      Effort: Pulmonary effort is normal.   Neurological:      Mental Status: She is alert and oriented to person, place, and time.   Psychiatric:         Mood and Affect: Mood normal.         Behavior: Behavior normal.         Assessment & Plan   Diagnoses and all orders for this visit:    1. Special screening (Primary)  -     POC Urinalysis " Dipstick  -     POC Pregnancy, Urine    2. Positive urine pregnancy test  -     HCG, B-subunit, Quantitative ([facility] Only)  -     HCG, B-subunit, Quantitative ([facility] Only); Future       Counseling was given to patient and family for the following topics: diagnostic results, instructions for management, impressions, and importance of treatment compliance . Total time of the encounter was 30 minutes and 25 minutes was spent counseling, exclusive of ultrasound results    .

## 2024-01-23 ENCOUNTER — TELEPHONE (OUTPATIENT)
Dept: OBSTETRICS AND GYNECOLOGY | Age: 24
End: 2024-01-23
Payer: MEDICAID

## 2024-01-23 LAB — HCG INTACT+B SERPL-ACNC: 48 MIU/ML

## 2024-01-23 NOTE — TELEPHONE ENCOUNTER
Patient called back saying she received a phone call . I didn't see a encounter . She said someone hung up , she has answered

## 2024-01-25 ENCOUNTER — TELEPHONE (OUTPATIENT)
Dept: OBSTETRICS AND GYNECOLOGY | Age: 24
End: 2024-01-25
Payer: MEDICAID

## 2024-01-25 NOTE — TELEPHONE ENCOUNTER
Patient notified of her lab results and she wants to let  know she is still bleeding.Has been for 2 weeks.After she left the appt.Monday,she bled through her pad and pants. Stated she had to wear an adult diaper.

## 2024-03-18 RX ORDER — PROGESTERONE 200 MG/1
200 CAPSULE ORAL DAILY
Qty: 30 CAPSULE | Refills: 1 | Status: SHIPPED | OUTPATIENT
Start: 2024-03-18

## 2024-03-19 ENCOUNTER — TELEPHONE (OUTPATIENT)
Dept: OBSTETRICS AND GYNECOLOGY | Age: 24
End: 2024-03-19
Payer: MEDICAID

## 2024-03-19 NOTE — TELEPHONE ENCOUNTER
Pt is upset that no one called her yesterday. Pt called yesterday requesting progesterone refill. Pt notd today by me that Dr Ferrer sent the refill. Pt is stating that someone was supposed to call her and let her know if she needed lab work before she starts the refill or not.

## 2024-04-22 RX ORDER — ASPIRIN 81 MG/1
81 TABLET ORAL DAILY
Qty: 90 TABLET | Refills: 2 | Status: SHIPPED | OUTPATIENT
Start: 2024-04-22

## 2024-04-22 NOTE — TELEPHONE ENCOUNTER
BHARATI LEE    [phone]    PT REQUESTING REFILLS OF BABY ASPIRIN    PHARMACY: [facility] ON [address]    (PT NO LONGER IN [place], BUT NOT CHANGING ADDRESS YET)

## 2024-05-21 RX ORDER — PROGESTERONE 200 MG/1
200 CAPSULE ORAL DAILY
Qty: 30 CAPSULE | Refills: 1 | Status: SHIPPED | OUTPATIENT
Start: 2024-05-21

## 2024-06-10 ENCOUNTER — TELEPHONE (OUTPATIENT)
Dept: OBSTETRICS AND GYNECOLOGY | Age: 24
End: 2024-06-10
Payer: MEDICAID

## 2024-06-10 NOTE — TELEPHONE ENCOUNTER
Provider: DR. BROUSSARD    Caller: BHARATI LEE    Relationship to Patient: SELF    Pharmacy: [facility] @ [id number]    Phone Number: [phone]    Reason for Call: PT STATES ON 1-22 DR. BROUSSARD ADV HER TO TAKE HALF OF HER METFORMIN AS IT WAS MAKING HER SICK, SO SHE REDUCED IT FROM 500MG  MGS. STATES SHE IS STILL GETTING SICK FROM IT HAS HAD DIARRHEA FOR PAST 3 WKS. WANTS TO KNOW IF SHE CAN PRESCRIBE SOMETHING ELSE..?    When was the patient last seen: 01-22

## 2024-06-10 NOTE — TELEPHONE ENCOUNTER
Unfortunately, there is nothing else that I can prescribe.  I would refer her to her primary care doctor to see what they can offer.  Can also send her to endocrinology if she would like.

## 2024-07-22 ENCOUNTER — TELEPHONE (OUTPATIENT)
Dept: OBSTETRICS AND GYNECOLOGY | Age: 24
End: 2024-07-22

## 2024-07-22 NOTE — TELEPHONE ENCOUNTER
I received a refill request for progesterone. Please clarify what she is taking progesterone for?

## 2024-07-22 NOTE — TELEPHONE ENCOUNTER
Caller: Santhosh Amayalie    Relationship: Self    Best call back number: [phone]    Requested Prescriptions: Progesterone (PROMETRIUM) 200 MG capsule AND aspirin 81 MG EC tablet   Requested Prescriptions      No prescriptions requested or ordered in this encounter        Pharmacy where request should be sent:  [facility] [id number] [place] [address]    Last office visit with prescribing clinician: 1/22/2024   Last telemedicine visit with prescribing clinician: Visit date not found   Next office visit with prescribing clinician: 12/10/2024     Additional details provided by patient: N/A    Does the patient have less than a 3 day supply:  [x] Yes  [] No    Would you like a call back once the refill request has been completed: [x] Yes [] No    If the office needs to give you a call back, can they leave a voicemail: [x] Yes [] No    Ivy Leo Rep   07/22/24 13:51 EDT

## 2024-07-23 RX ORDER — ASPIRIN 81 MG/1
81 TABLET, COATED ORAL DAILY
Qty: 180 TABLET | Refills: 0 | Status: SHIPPED | OUTPATIENT
Start: 2024-07-23

## 2024-07-24 RX ORDER — PROGESTERONE 200 MG/1
200 CAPSULE ORAL DAILY
Qty: 30 CAPSULE | Refills: 1 | OUTPATIENT
Start: 2024-07-24

## 2024-10-01 ENCOUNTER — OFFICE VISIT (OUTPATIENT)
Dept: OBSTETRICS AND GYNECOLOGY | Age: 24
End: 2024-10-01
Payer: MEDICAID

## 2024-10-01 VITALS
DIASTOLIC BLOOD PRESSURE: 70 MMHG | SYSTOLIC BLOOD PRESSURE: 126 MMHG | BODY MASS INDEX: 43.02 KG/M2 | HEIGHT: 64 IN | WEIGHT: 252 LBS

## 2024-10-01 DIAGNOSIS — E28.2 PCOS (POLYCYSTIC OVARIAN SYNDROME): ICD-10-CM

## 2024-10-01 DIAGNOSIS — F84.0 AUTISM: ICD-10-CM

## 2024-10-01 DIAGNOSIS — Z30.09 ENCOUNTER FOR OTHER GENERAL COUNSELING OR ADVICE ON CONTRACEPTION: Primary | ICD-10-CM

## 2024-10-01 DIAGNOSIS — N92.6 IRREGULAR MENSES: ICD-10-CM

## 2024-10-01 LAB
B-HCG UR QL: NEGATIVE
EXPIRATION DATE: NORMAL
INTERNAL NEGATIVE CONTROL: NEGATIVE
INTERNAL POSITIVE CONTROL: NORMAL
Lab: NORMAL

## 2024-10-01 RX ORDER — LUMATEPERONE 21 MG/1
1 CAPSULE ORAL DAILY
COMMUNITY
Start: 2024-09-09

## 2024-10-01 NOTE — PROGRESS NOTES
"Angela Amaya is a 24 y.o. female  presents to follow up on meds.  Pregnancy test today is negative.  Patient is desiring pregnancy but her guardian, Isabella is here with her today and desires for her to be on birth control.  Isabella reports the  has stated she can be involved in contraception for the patient.  After discussion, and going through options patient would like to try a Kyleena IUD again.    I last saw her for US and  pregnancy confirmation LMP + urine hcg at home on 1/12/24 , noticed some pinkish discharge last week on Tuesday.  Reports she fell last Thursday at home and after that she reports having some bleeding with right side abdominal pain.  Did restart metformin yesterday 250 mg - 500 was making her sick GI issues and vomiting.   Ultrasound today is essentially normal with a possible decidual reaction.  Discussed with patient that we will need to check a quant today and in 48 hours to determine if this is a normally progressing pregnancy.  Advised patient to report to the ER for significant vaginal bleeding or lower quadrant pain.  Advised patient to continue aspirin, prenatal vitamin and progesterone only with pregnancy test.   History of Present Illness    The following portions of the patient's history were reviewed and updated as appropriate: allergies, current medications, past family history, past medical history, past social history, past surgical history, and problem list.    Review of Systems   Constitutional:  Negative for chills, fatigue and fever.   Gastrointestinal:  Negative for abdominal distention and abdominal pain.   Genitourinary:  Negative for vaginal bleeding, vaginal discharge and vaginal pain.   All other systems reviewed and are negative.  /70   Ht 162.6 cm (64\")   Wt 114 kg (252 lb)   BMI 43.26 kg/m²       Objective   Physical Exam  Vitals and nursing note reviewed.   Constitutional:       Appearance: Normal appearance. She is obese.   Pulmonary:    "   Effort: Pulmonary effort is normal.   Neurological:      Mental Status: She is alert and oriented to person, place, and time.   Psychiatric:         Mood and Affect: Mood normal.         Behavior: Behavior normal.         Assessment & Plan   Diagnoses and all orders for this visit:    1. Encounter for other general counseling or advice on contraception (Primary)    2. PCOS (polycystic ovarian syndrome)    3. Irregular menses  -     POC Pregnancy, Urine    4. Autism       Counseling was given to patient and guardian  for the following topics: instructions for management, impressions, risks and benefits of treatment options, and importance of treatment compliance . Total time of the encounter was 30 minutes and 25 minutes was spent counseling.    Return in 4 weeks for Kyleena IUD placement.

## 2024-10-29 ENCOUNTER — OFFICE VISIT (OUTPATIENT)
Dept: OBSTETRICS AND GYNECOLOGY | Age: 24
End: 2024-10-29
Payer: MEDICAID

## 2024-10-29 VITALS
DIASTOLIC BLOOD PRESSURE: 74 MMHG | WEIGHT: 252 LBS | SYSTOLIC BLOOD PRESSURE: 124 MMHG | BODY MASS INDEX: 43.02 KG/M2 | HEIGHT: 64 IN

## 2024-10-29 DIAGNOSIS — Z13.9 SPECIAL SCREENING: ICD-10-CM

## 2024-10-29 DIAGNOSIS — Z30.430 ENCOUNTER FOR INSERTION OF INTRAUTERINE CONTRACEPTIVE DEVICE (IUD): Primary | ICD-10-CM

## 2024-10-29 PROBLEM — Z97.5 IUD (INTRAUTERINE DEVICE) IN PLACE: Status: ACTIVE | Noted: 2024-10-29

## 2024-10-29 RX ORDER — HYDROXYZINE PAMOATE 25 MG/1
25 CAPSULE ORAL
COMMUNITY
Start: 2024-10-03

## 2024-10-29 RX ORDER — SPIRONOLACTONE 50 MG/1
50 TABLET, FILM COATED ORAL DAILY
COMMUNITY
Start: 2024-10-03 | End: 2025-10-03

## 2024-10-29 RX ORDER — BETAMETHASONE DIPROPIONATE 0.5 MG/G
CREAM TOPICAL
COMMUNITY
Start: 2024-10-03 | End: 2025-10-03

## 2024-10-29 NOTE — PROGRESS NOTES
Procedure   ProceduresIUD Insertion Procedure Note    Pre-operative Diagnosis: contraception     Post-operative Diagnosis: same    Indications: contraception    Procedure Details   Urine pregnancy test was done and was NEGATIVE .  The risks (including infection, bleeding, pain, and uterine perforation) and benefits of the procedure were explained to the patient and Written informed consent was obtained.      Cervix cleansed with Betadine. Uterus sounded to 7 cm. IUD inserted without difficulty. String visible and trimmed.    IUD Information:  Kyleena, Lot # [id number], Expiration date 2026/dec.    Condition:  Stable    Complications:  None  Patient tolerated the procedure well without complications.    Plan:    The patient was advised to call for any fever or for prolonged or severe pain or bleeding. She was advised to use NSAID as needed for mild to moderate pain.     Attending Physician Documentation:  I was present for the entire procedure.

## 2024-12-10 ENCOUNTER — OFFICE VISIT (OUTPATIENT)
Dept: OBSTETRICS AND GYNECOLOGY | Age: 24
End: 2024-12-10
Payer: MEDICAID

## 2024-12-10 VITALS
SYSTOLIC BLOOD PRESSURE: 120 MMHG | WEIGHT: 258 LBS | BODY MASS INDEX: 44.05 KG/M2 | DIASTOLIC BLOOD PRESSURE: 80 MMHG | HEIGHT: 64 IN

## 2024-12-10 DIAGNOSIS — Z97.5 IUD (INTRAUTERINE DEVICE) IN PLACE: ICD-10-CM

## 2024-12-10 DIAGNOSIS — Z12.4 SCREENING FOR MALIGNANT NEOPLASM OF THE CERVIX: ICD-10-CM

## 2024-12-10 DIAGNOSIS — Z01.419 ENCOUNTER FOR GYNECOLOGICAL EXAMINATION WITHOUT ABNORMAL FINDING: Primary | ICD-10-CM

## 2024-12-10 DIAGNOSIS — Z13.89 SCREENING FOR BLOOD OR PROTEIN IN URINE: ICD-10-CM

## 2024-12-10 DIAGNOSIS — N89.8 VAGINAL DISCHARGE: ICD-10-CM

## 2024-12-10 DIAGNOSIS — Z11.51 SPECIAL SCREENING EXAMINATION FOR HUMAN PAPILLOMAVIRUS (HPV): ICD-10-CM

## 2024-12-10 DIAGNOSIS — R30.0 DYSURIA: ICD-10-CM

## 2024-12-10 LAB
BILIRUB BLD-MCNC: NEGATIVE MG/DL
GLUCOSE UR STRIP-MCNC: NEGATIVE MG/DL
KETONES UR QL: NEGATIVE
LEUKOCYTE EST, POC: NEGATIVE
NITRITE UR-MCNC: NEGATIVE MG/ML
PH UR: 7 [PH] (ref 5–8)
PROT UR STRIP-MCNC: NEGATIVE MG/DL
RBC # UR STRIP: NEGATIVE /UL
SP GR UR: 1.02 (ref 1–1.03)
UROBILINOGEN UR QL: NORMAL

## 2024-12-10 RX ORDER — LEVONORGESTREL 19.5 MG/1
1 INTRAUTERINE DEVICE INTRAUTERINE ONCE
COMMUNITY

## 2024-12-10 RX ORDER — TRIAMCINOLONE ACETONIDE 5 MG/G
CREAM TOPICAL
COMMUNITY
Start: 2024-11-18

## 2024-12-10 RX ORDER — CEFDINIR 300 MG/1
CAPSULE ORAL
COMMUNITY
Start: 2024-12-03

## 2024-12-10 NOTE — PROGRESS NOTES
"Angela Amaya is a 24 y.o. female presents for annual visit today with US to check IUD placement , last pap 11/21/23 neg , no periods with Kyleena , c/o dysuria burning  when urinating , denies any vaginal discharge.      History of Present Illness    The following portions of the patient's history were reviewed and updated as appropriate: allergies, current medications, past family history, past medical history, past social history, past surgical history, and problem list.    Review of Systems   Constitutional:  Negative for chills, fatigue and fever.   Gastrointestinal:  Negative for abdominal distention and abdominal pain.   Genitourinary:  Negative for dysuria, pelvic pain, vaginal bleeding, vaginal discharge and vaginal pain.   All other systems reviewed and are negative.    /80   Ht 162.6 cm (64\")   Wt 117 kg (258 lb)   LMP  (LMP Unknown)   BMI 44.29 kg/m²     Objective   Physical Exam  Vitals and nursing note reviewed.   Constitutional:       Appearance: Normal appearance. She is well-developed and normal weight.   Neck:      Thyroid: No thyromegaly.   Cardiovascular:      Rate and Rhythm: Normal rate.   Pulmonary:      Effort: Pulmonary effort is normal.   Chest:   Breasts:     Right: No mass, nipple discharge, skin change or tenderness.      Left: No mass, nipple discharge, skin change or tenderness.   Abdominal:      General: There is no distension.      Palpations: Abdomen is soft.      Tenderness: There is no abdominal tenderness.   Genitourinary:     General: Normal vulva.      Exam position: Lithotomy position.      Labia:         Right: No rash or lesion.         Left: No rash or lesion.       Vagina: Normal. No vaginal discharge or bleeding.      Cervix: No friability or lesion.      Uterus: Not enlarged and not tender.       Adnexa:         Right: No mass or tenderness.          Left: No mass or tenderness.        Comments: IUD strings visualized at os   Musculoskeletal:    "      General: Normal range of motion.      Cervical back: Normal range of motion.   Skin:     General: Skin is warm and dry.      Findings: No rash.   Neurological:      Mental Status: She is alert and oriented to person, place, and time.   Psychiatric:         Mood and Affect: Mood normal.         Behavior: Behavior normal.           Assessment & Plan   Diagnoses and all orders for this visit:    1. Encounter for gynecological examination without abnormal finding (Primary)    2. Screening for blood or protein in urine  -     POC Urinalysis Dipstick    3. Vaginal discharge  -     NuSwab VG+ - Swab, Vagina    4. Dysuria  -     Urine Culture - Urine, Urine, Clean Catch    5. IUD (intrauterine device) in place      Counseling was given to patient for the following topics: diagnostic results, impressions, importance of treatment compliance, and self-breast exams    .   Return in about 1 year (around 12/10/2025) for Annual physical.

## 2024-12-12 LAB
A VAGINAE DNA VAG QL NAA+PROBE: NORMAL SCORE
BACTERIA UR CULT: NORMAL
BACTERIA UR CULT: NORMAL
BVAB2 DNA VAG QL NAA+PROBE: NORMAL SCORE
C ALBICANS DNA VAG QL NAA+PROBE: NEGATIVE
C GLABRATA DNA VAG QL NAA+PROBE: NEGATIVE
C TRACH DNA SPEC QL NAA+PROBE: NEGATIVE
MEGA1 DNA VAG QL NAA+PROBE: NORMAL SCORE
N GONORRHOEA DNA VAG QL NAA+PROBE: NEGATIVE
T VAGINALIS DNA VAG QL NAA+PROBE: NEGATIVE

## 2024-12-16 LAB
CONV .: NORMAL
CYTOLOGIST CVX/VAG CYTO: NORMAL
CYTOLOGY CVX/VAG DOC CYTO: NORMAL
CYTOLOGY CVX/VAG DOC THIN PREP: NORMAL
DX ICD CODE: NORMAL
Lab: NORMAL
OTHER STN SPEC: NORMAL
STAT OF ADQ CVX/VAG CYTO-IMP: NORMAL

## 2025-02-07 RX ORDER — ASPIRIN 81 MG/1
81 TABLET, COATED ORAL DAILY
Qty: 180 TABLET | Refills: 0 | Status: SHIPPED | OUTPATIENT
Start: 2025-02-07

## 2025-03-19 ENCOUNTER — TELEPHONE (OUTPATIENT)
Dept: OBSTETRICS AND GYNECOLOGY | Age: 25
End: 2025-03-19

## 2025-07-30 ENCOUNTER — OFFICE VISIT (OUTPATIENT)
Dept: OBSTETRICS AND GYNECOLOGY | Age: 25
End: 2025-07-30
Payer: MEDICAID

## 2025-07-30 VITALS
WEIGHT: 261 LBS | HEIGHT: 64 IN | BODY MASS INDEX: 44.56 KG/M2 | DIASTOLIC BLOOD PRESSURE: 86 MMHG | SYSTOLIC BLOOD PRESSURE: 122 MMHG

## 2025-07-30 DIAGNOSIS — Z11.3 SCREENING FOR STDS (SEXUALLY TRANSMITTED DISEASES): ICD-10-CM

## 2025-07-30 DIAGNOSIS — N92.1 BREAKTHROUGH BLEEDING ASSOCIATED WITH INTRAUTERINE DEVICE (IUD): ICD-10-CM

## 2025-07-30 DIAGNOSIS — N83.202 CYST OF LEFT OVARY: ICD-10-CM

## 2025-07-30 DIAGNOSIS — Z97.5 IUD (INTRAUTERINE DEVICE) IN PLACE: Primary | ICD-10-CM

## 2025-07-30 DIAGNOSIS — Z97.5 BREAKTHROUGH BLEEDING ASSOCIATED WITH INTRAUTERINE DEVICE (IUD): ICD-10-CM

## 2025-07-30 RX ORDER — SERTRALINE HYDROCHLORIDE 100 MG/1
TABLET, FILM COATED ORAL
COMMUNITY
Start: 2025-02-21

## 2025-07-30 RX ORDER — ERGOCALCIFEROL 1.25 MG/1
50000 CAPSULE, LIQUID FILLED ORAL
COMMUNITY
Start: 2025-03-12 | End: 2025-09-08

## 2025-07-30 NOTE — PROGRESS NOTES
"Subjective     History of Present Illness  Sita Amaya is a 25 y.o.  female is being seen today for   Chief Complaint   Patient presents with    Follow-up     CC: gyn f/u IUD check. Pt complains can't find strings and bleeding. States she hasn't been able to feel her iud. 3 weeks straight of bleeding.      C/o breakthrough bleeding with IUD x 3 weeks. She was bleeding daily for 3 weeks, bleeding stopped 2 days ago. Changed pad/tampon every 3 hours.   Usually no menses with IUD.  Admits some stress recently, now in open relationship. Sexually active. No new sex partners.   No vaginal itching or discharge. No recent illness. No pelvic pain.   She can no longer feel IUD strings which was also concerning for her.   Kyleena IUD inserted 10/29/2024 by Dr. Ferrer.    Relevant data reviewed:      The following portions of the patient's history were reviewed and updated as appropriate: allergies, current medications, past family history, past medical history, past social history, past surgical history and problem list.    /86   Ht 162.6 cm (64\")   Wt 118 kg (261 lb)   LMP 2025 (Approximate)   BMI 44.80 kg/m²         Review of Systems   Constitutional: Negative.    HENT: Negative.     Eyes: Negative.    Respiratory: Negative.     Cardiovascular: Negative.    Gastrointestinal: Negative.    Endocrine: Negative.    Genitourinary:  Positive for vaginal bleeding.   Musculoskeletal: Negative.    Skin: Negative.    Allergic/Immunologic: Negative.    Neurological: Negative.    Hematological: Negative.    Psychiatric/Behavioral: Negative.         Objective   Physical Exam  Constitutional:       General: She is not in acute distress.  Cardiovascular:      Rate and Rhythm: Normal rate and regular rhythm.      Pulses: Normal pulses.      Heart sounds: Normal heart sounds.   Pulmonary:      Effort: Pulmonary effort is normal.      Breath sounds: Normal breath sounds.   Genitourinary:     Exam position: Lithotomy " position.      Labia:         Right: No rash, tenderness or lesion.         Left: No rash, tenderness or lesion.       Vagina: Normal.      Cervix: Normal.      Comments: IUD strings visible at os  Neurological:      Mental Status: She is alert and oriented to person, place, and time.   Psychiatric:         Mood and Affect: Mood normal.         Behavior: Behavior normal.         Thought Content: Thought content normal.         Judgment: Judgment normal.           Assessment & Plan   Diagnoses and all orders for this visit:    1. IUD (intrauterine device) in place (Primary)    2. Cyst of left ovary    3. Breakthrough bleeding associated with intrauterine device (IUD)  -     NuSwab VG+ - Swab, Vagina    4. Screening for STDs (sexually transmitted diseases)  -     NuSwab VG+ - Swab, Vagina    -Ultrasound completed today showing anteverted uterus with possible septate, IUD in normal position, left ovary with simple cyst measuring 3.44 cm x 2.72 cm, right ovary normal. Awaiting final report by Dr. Ferrer. Reviewed US with pt.   -IUD in place on US and IUD strings visible on exam. Discussed with pt that irregular bleeding can occur with IUD. Also completed nuswab VG+ to rule in/out infections for irregular bleeding. Will call patient with results and treat accordingly.  -Simple cyst on L ovary is < 5 cm so no additional tx needed. Reviewed signs/sx of ruptured ovarian cyst and ovarian torsion, instructed pt to go to ER if these occur.     All questions answered. Patient verbalizes understanding and is agreeable to plan.  Return if symptoms worsen or fail to improve.         Sowmya Mackey PA-C  7/30/2025 13:42 EDT

## 2025-08-01 LAB
A VAGINAE DNA VAG QL NAA+PROBE: NORMAL SCORE
BVAB2 DNA VAG QL NAA+PROBE: NORMAL SCORE
C ALBICANS DNA VAG QL NAA+PROBE: NEGATIVE
C GLABRATA DNA VAG QL NAA+PROBE: NEGATIVE
C TRACH DNA SPEC QL NAA+PROBE: NEGATIVE
MEGA1 DNA VAG QL NAA+PROBE: NORMAL SCORE
N GONORRHOEA DNA VAG QL NAA+PROBE: NEGATIVE
T VAGINALIS DNA VAG QL NAA+PROBE: NEGATIVE